# Patient Record
Sex: FEMALE | Race: WHITE | NOT HISPANIC OR LATINO | Employment: FULL TIME | URBAN - METROPOLITAN AREA
[De-identification: names, ages, dates, MRNs, and addresses within clinical notes are randomized per-mention and may not be internally consistent; named-entity substitution may affect disease eponyms.]

---

## 2023-06-21 ENCOUNTER — APPOINTMENT (OUTPATIENT)
Dept: LAB | Facility: LAB | Age: 43
End: 2023-06-21
Payer: COMMERCIAL

## 2023-06-21 LAB
ALBUMIN (G/DL) IN SER/PLAS: 4.1 G/DL (ref 3.4–5)
ANION GAP IN SER/PLAS: 15 MMOL/L (ref 10–20)
CALCIUM (MG/DL) IN SER/PLAS: 9.3 MG/DL (ref 8.6–10.6)
CARBON DIOXIDE, TOTAL (MMOL/L) IN SER/PLAS: 25 MMOL/L (ref 21–32)
CHLORIDE (MMOL/L) IN SER/PLAS: 102 MMOL/L (ref 98–107)
CREATININE (MG/DL) IN SER/PLAS: 0.77 MG/DL (ref 0.5–1.05)
GFR FEMALE: >90 ML/MIN/1.73M2
GLUCOSE (MG/DL) IN SER/PLAS: 91 MG/DL (ref 74–99)
PHOSPHATE (MG/DL) IN SER/PLAS: 3.3 MG/DL (ref 2.5–4.9)
POTASSIUM (MMOL/L) IN SER/PLAS: 4 MMOL/L (ref 3.5–5.3)
SODIUM (MMOL/L) IN SER/PLAS: 138 MMOL/L (ref 136–145)
UREA NITROGEN (MG/DL) IN SER/PLAS: 13 MG/DL (ref 6–23)

## 2023-10-04 DIAGNOSIS — K21.9 GASTROESOPHAGEAL REFLUX DISEASE WITHOUT ESOPHAGITIS: Primary | ICD-10-CM

## 2023-10-04 DIAGNOSIS — M35.9 UNDIFFERENTIATED CONNECTIVE TISSUE DISEASE (MULTI): Primary | ICD-10-CM

## 2023-10-04 RX ORDER — HYDROXYCHLOROQUINE SULFATE 200 MG/1
200 TABLET, FILM COATED ORAL DAILY
Qty: 30 TABLET | Refills: 1 | Status: SHIPPED | OUTPATIENT
Start: 2023-10-04 | End: 2023-12-01 | Stop reason: SDUPTHER

## 2023-10-04 RX ORDER — HYDROXYCHLOROQUINE SULFATE 200 MG/1
1 TABLET, FILM COATED ORAL DAILY
COMMUNITY
Start: 2020-10-08 | End: 2023-10-04 | Stop reason: SDUPTHER

## 2023-10-05 ENCOUNTER — PHARMACY VISIT (OUTPATIENT)
Dept: PHARMACY | Facility: CLINIC | Age: 43
End: 2023-10-05
Payer: COMMERCIAL

## 2023-10-05 PROCEDURE — RXMED WILLOW AMBULATORY MEDICATION CHARGE

## 2023-10-06 RX ORDER — PANTOPRAZOLE SODIUM 40 MG/1
40 TABLET, DELAYED RELEASE ORAL DAILY
Qty: 330 TABLET | Refills: 0 | Status: SHIPPED | OUTPATIENT
Start: 2023-10-06 | End: 2023-11-14 | Stop reason: SDUPTHER

## 2023-10-10 ENCOUNTER — PHARMACY VISIT (OUTPATIENT)
Dept: PHARMACY | Facility: CLINIC | Age: 43
End: 2023-10-10
Payer: COMMERCIAL

## 2023-10-10 PROCEDURE — RXMED WILLOW AMBULATORY MEDICATION CHARGE

## 2023-10-27 ENCOUNTER — PHARMACY VISIT (OUTPATIENT)
Dept: PHARMACY | Facility: CLINIC | Age: 43
End: 2023-10-27
Payer: COMMERCIAL

## 2023-10-27 PROCEDURE — RXMED WILLOW AMBULATORY MEDICATION CHARGE

## 2023-11-02 ENCOUNTER — PHARMACY VISIT (OUTPATIENT)
Dept: PHARMACY | Facility: CLINIC | Age: 43
End: 2023-11-02
Payer: COMMERCIAL

## 2023-11-02 ENCOUNTER — TELEPHONE (OUTPATIENT)
Dept: OBSTETRICS AND GYNECOLOGY | Facility: CLINIC | Age: 43
End: 2023-11-02
Payer: COMMERCIAL

## 2023-11-02 DIAGNOSIS — Z12.31 SCREENING MAMMOGRAM FOR BREAST CANCER: Primary | ICD-10-CM

## 2023-11-02 PROCEDURE — RXMED WILLOW AMBULATORY MEDICATION CHARGE

## 2023-11-14 ENCOUNTER — OFFICE VISIT (OUTPATIENT)
Dept: GASTROENTEROLOGY | Facility: CLINIC | Age: 43
End: 2023-11-14
Payer: COMMERCIAL

## 2023-11-14 VITALS — HEIGHT: 61 IN | OXYGEN SATURATION: 98 % | BODY MASS INDEX: 40.37 KG/M2 | WEIGHT: 213.8 LBS | HEART RATE: 106 BPM

## 2023-11-14 DIAGNOSIS — R07.9 CHEST PAIN, UNSPECIFIED TYPE: Primary | ICD-10-CM

## 2023-11-14 DIAGNOSIS — K21.9 GASTROESOPHAGEAL REFLUX DISEASE WITHOUT ESOPHAGITIS: ICD-10-CM

## 2023-11-14 PROCEDURE — 1036F TOBACCO NON-USER: CPT | Performed by: INTERNAL MEDICINE

## 2023-11-14 PROCEDURE — 99214 OFFICE O/P EST MOD 30 MIN: CPT | Performed by: INTERNAL MEDICINE

## 2023-11-14 RX ORDER — PANTOPRAZOLE SODIUM 40 MG/1
40 TABLET, DELAYED RELEASE ORAL DAILY
Qty: 90 TABLET | Refills: 3 | Status: SHIPPED | OUTPATIENT
Start: 2023-11-14 | End: 2024-11-13

## 2023-11-14 NOTE — PROGRESS NOTES
Subjective     History of Present Illness:   41 yo with history of chronic constipation, GERD, anxiety, migraine HA, undifferentiated connective tissue disease, seen in follow up .  Last seen 1 yr ago doing well on pantoprazole 40 mg daily. No issues with constipation at that time.       Overall doing well on pantoprazole. Denies heartburn/indigestion.  Over the last few  months has had ss chest pain with ambulation in AM, that resolves through day.  No dysphagia/odynophagia.  No unintentional weight loss.  Has been laying down within 1-2 hrs of eating at night.  Cardiac evaluation negative.  Bowel movements regular , no straining or incomplete evacuation.  No melena/brbpr  Takes nsaids occasionally        Colonoscopy 4/2021 sigmoid inflammation biopsies c/w focal active colitis; remainder of colon biopsies normal. Hgb 10.5, stool pcr negative, fecal calprotectin 190. Given cipro/flagyl .    EGD/colon 4/2018 negative      Allergies  Not on File    Medications       Objective   There were no vitals taken for this visit.   Physical Exam  Vitals reviewed.   Constitutional:       General: She is awake.   Cardiovascular:      Rate and Rhythm: Normal rate and regular rhythm.   Pulmonary:      Effort: Pulmonary effort is normal.      Breath sounds: Normal breath sounds.   Abdominal:      General: Bowel sounds are normal.      Palpations: Abdomen is soft.      Tenderness: There is no abdominal tenderness.   Neurological:      Mental Status: She is alert and oriented to person, place, and time.   Psychiatric:         Attention and Perception: Attention and perception normal.         Behavior: Behavior normal.               Assessment/Plan:  41 yo with history of chronic constipation, GERD, anxiety, migraine HA, undifferentiated connective tissue disease, seen in follow up. Recent chest pain with negative cardiac evaluation, possibly due to reflux.  Recommended to leave 3 hours between eating and laying down in evening and  will change PPI dosing to before dinner.  If persistent symptoms may change to alternate PPI. EGD may be needed if persistent sx to evaluaet for erosive disease, EOE, h pylori. Pt instructed to contact office if no improvement.          Rachel Nicolas MD

## 2023-11-15 ENCOUNTER — HOSPITAL ENCOUNTER (OUTPATIENT)
Dept: RADIOLOGY | Facility: HOSPITAL | Age: 43
Discharge: HOME | End: 2023-11-15
Payer: COMMERCIAL

## 2023-11-15 DIAGNOSIS — Z12.31 SCREENING MAMMOGRAM FOR BREAST CANCER: ICD-10-CM

## 2023-11-15 PROCEDURE — 77067 SCR MAMMO BI INCL CAD: CPT | Performed by: RADIOLOGY

## 2023-11-15 PROCEDURE — 77063 BREAST TOMOSYNTHESIS BI: CPT | Performed by: RADIOLOGY

## 2023-11-15 PROCEDURE — 77067 SCR MAMMO BI INCL CAD: CPT

## 2023-11-17 ENCOUNTER — PHARMACY VISIT (OUTPATIENT)
Dept: PHARMACY | Facility: CLINIC | Age: 43
End: 2023-11-17
Payer: COMMERCIAL

## 2023-11-17 PROCEDURE — RXMED WILLOW AMBULATORY MEDICATION CHARGE

## 2023-11-22 ENCOUNTER — TELEPHONE (OUTPATIENT)
Dept: OBSTETRICS AND GYNECOLOGY | Facility: CLINIC | Age: 43
End: 2023-11-22
Payer: COMMERCIAL

## 2023-11-22 DIAGNOSIS — R92.8 ABNORMAL MAMMOGRAM: Primary | ICD-10-CM

## 2023-11-27 ENCOUNTER — PHARMACY VISIT (OUTPATIENT)
Dept: PHARMACY | Facility: CLINIC | Age: 43
End: 2023-11-27
Payer: COMMERCIAL

## 2023-11-27 PROCEDURE — RXMED WILLOW AMBULATORY MEDICATION CHARGE

## 2023-11-28 ENCOUNTER — HOSPITAL ENCOUNTER (OUTPATIENT)
Dept: RADIOLOGY | Facility: HOSPITAL | Age: 43
Discharge: HOME | End: 2023-11-28
Payer: COMMERCIAL

## 2023-11-28 DIAGNOSIS — R92.8 OTHER ABNORMAL AND INCONCLUSIVE FINDINGS ON DIAGNOSTIC IMAGING OF BREAST: ICD-10-CM

## 2023-11-28 DIAGNOSIS — R92.8 ABNORMAL MAMMOGRAM: ICD-10-CM

## 2023-11-28 PROCEDURE — 76642 ULTRASOUND BREAST LIMITED: CPT | Mod: RT

## 2023-11-28 PROCEDURE — 76982 USE 1ST TARGET LESION: CPT | Mod: RT

## 2023-11-28 PROCEDURE — 76642 ULTRASOUND BREAST LIMITED: CPT | Mod: RIGHT SIDE | Performed by: RADIOLOGY

## 2023-11-29 ENCOUNTER — PHARMACY VISIT (OUTPATIENT)
Dept: PHARMACY | Facility: CLINIC | Age: 43
End: 2023-11-29
Payer: COMMERCIAL

## 2023-11-29 PROCEDURE — RXMED WILLOW AMBULATORY MEDICATION CHARGE

## 2023-12-01 DIAGNOSIS — M35.9 UNDIFFERENTIATED CONNECTIVE TISSUE DISEASE (MULTI): ICD-10-CM

## 2023-12-02 RX ORDER — HYDROXYCHLOROQUINE SULFATE 200 MG/1
200 TABLET, FILM COATED ORAL DAILY
Qty: 30 TABLET | Refills: 0 | Status: SHIPPED | OUTPATIENT
Start: 2023-12-02 | End: 2023-12-12 | Stop reason: SDUPTHER

## 2023-12-04 ENCOUNTER — PHARMACY VISIT (OUTPATIENT)
Dept: PHARMACY | Facility: CLINIC | Age: 43
End: 2023-12-04
Payer: COMMERCIAL

## 2023-12-04 PROCEDURE — RXMED WILLOW AMBULATORY MEDICATION CHARGE

## 2023-12-12 ENCOUNTER — OFFICE VISIT (OUTPATIENT)
Dept: RHEUMATOLOGY | Facility: CLINIC | Age: 43
End: 2023-12-12
Payer: COMMERCIAL

## 2023-12-12 VITALS
SYSTOLIC BLOOD PRESSURE: 130 MMHG | BODY MASS INDEX: 40.02 KG/M2 | DIASTOLIC BLOOD PRESSURE: 82 MMHG | HEIGHT: 61 IN | WEIGHT: 212 LBS

## 2023-12-12 DIAGNOSIS — M35.9 UNDIFFERENTIATED CONNECTIVE TISSUE DISEASE (MULTI): ICD-10-CM

## 2023-12-12 DIAGNOSIS — Z79.899 LONG-TERM USE OF PLAQUENIL: Primary | ICD-10-CM

## 2023-12-12 PROCEDURE — 99213 OFFICE O/P EST LOW 20 MIN: CPT | Performed by: INTERNAL MEDICINE

## 2023-12-12 PROCEDURE — 1036F TOBACCO NON-USER: CPT | Performed by: INTERNAL MEDICINE

## 2023-12-12 RX ORDER — HYDROXYCHLOROQUINE SULFATE 200 MG/1
200 TABLET, FILM COATED ORAL DAILY
Qty: 90 TABLET | Refills: 3 | Status: SHIPPED | OUTPATIENT
Start: 2023-12-12 | End: 2024-02-01 | Stop reason: SDUPTHER

## 2023-12-12 RX ORDER — CHOLECALCIFEROL (VITAMIN D3) 25 MCG
1000 TABLET ORAL DAILY
COMMUNITY

## 2023-12-12 RX ORDER — FLUTICASONE PROPIONATE 50 MCG
2 SPRAY, SUSPENSION (ML) NASAL DAILY
COMMUNITY
Start: 2019-07-29

## 2023-12-12 ASSESSMENT — ENCOUNTER SYMPTOMS
FATIGUE: 1
SLEEP DISTURBANCE: 1

## 2023-12-12 NOTE — PROGRESS NOTES
Subjective   Patient ID: Paola Mahoney is a 43 y.o. female who presents for Follow-up (Annual follow up/Hip pain in both hips for about 6 months ).  HPI  Patient with positive SERGIO with negative ANGELICA panel with elevated inflammatory markers and enlarged lymph nodes. Has negative RF, CCP, negative HLA-B27 negative ACE level, negative ANCA, negative antithyroid peroxidase antibody though enlarged thyroid seen on scan, multiple sclerosis has been ruled out       At her last visit on 12/13/2022, we had her continue with hydroxychloroquine.  In the past she had discontinued and had increase in symptoms.    She feels she still has flareups.  Some there is a couple days in a row that she will feel sore and sometimes it is around her and sees.    Her neck has been bothering her and in her hips now for the last 6 months.  Getting down on the ground has been more tricky.  She tries to stretch.    Her mouth and eyes has been more dry and her provider prescribed pilocarpine which has been helpful.    She did see a cardiologist because she was getting her chest when she exerted herself.  Workup was negative.  She saw her GI provider and had her switch medication to a different time of day and it does not seem to have changed her symptoms.  Review of Systems   Constitutional:  Positive for fatigue.   HENT:          + dry mouth   Eyes:         +dry eyes   Musculoskeletal:         Per HPI   Psychiatric/Behavioral:  Positive for sleep disturbance.        Objective   Physical Exam  GEN: NAD A&O x3 appropriate affect  HENT: no enlarged glands or thyroid  EYES: no conjunctival redness, eyelids normal  LYMPH: no cervical lymphadenopathy  NEURO: 5/5 strength upper and lower extremities, DTR +2 bicep and patellar tendons  SKIN: no rashes,   PULSES: +radials  TENDER POINTS: 0/18   MSK:   no synovitis in the joints of her hands, wrist elbows shoulder knees ankles.  Good internal/external rotation of the bilateral hips.  Good  flexion.  Assessment/Plan       Undifferentiated connective tissue disease with positive SERGIO, lymphadenopathy, elevated inflammatory markers with muscle, neurologic, joint symptoms. Previously had been on sulfasalazine   -She does believe that hydroxychloroquine has been helpful for musculoskeletal symptoms.  Will do blood work again concerning her positive SERGIO.  Symptoms usually occur around her menses.    -Discussed about hip flexor stretches.        Will see her back in 1 year or as needed.  Mady Chen MD 12/12/23 3:45 PM

## 2023-12-26 PROCEDURE — RXMED WILLOW AMBULATORY MEDICATION CHARGE

## 2023-12-28 ENCOUNTER — PHARMACY VISIT (OUTPATIENT)
Dept: PHARMACY | Facility: CLINIC | Age: 43
End: 2023-12-28
Payer: COMMERCIAL

## 2024-01-24 ENCOUNTER — LAB (OUTPATIENT)
Dept: LAB | Facility: LAB | Age: 44
End: 2024-01-24
Payer: COMMERCIAL

## 2024-01-24 DIAGNOSIS — M35.9 UNDIFFERENTIATED CONNECTIVE TISSUE DISEASE (MULTI): ICD-10-CM

## 2024-01-24 LAB
ALBUMIN SERPL BCP-MCNC: 3.9 G/DL (ref 3.4–5)
ALP SERPL-CCNC: 72 U/L (ref 33–110)
ALT SERPL W P-5'-P-CCNC: 13 U/L (ref 7–45)
ANION GAP SERPL CALC-SCNC: 15 MMOL/L (ref 10–20)
AST SERPL W P-5'-P-CCNC: 15 U/L (ref 9–39)
BILIRUB SERPL-MCNC: 0.3 MG/DL (ref 0–1.2)
BUN SERPL-MCNC: 10 MG/DL (ref 6–23)
C3 SERPL-MCNC: 189 MG/DL (ref 87–200)
C4 SERPL-MCNC: 52 MG/DL (ref 10–50)
CALCIUM SERPL-MCNC: 9.6 MG/DL (ref 8.6–10.6)
CENTROMERE B AB SER-ACNC: <0.2 AI
CHLORIDE SERPL-SCNC: 104 MMOL/L (ref 98–107)
CHROMATIN AB SERPL-ACNC: <0.2 AI
CO2 SERPL-SCNC: 24 MMOL/L (ref 21–32)
CREAT SERPL-MCNC: 0.69 MG/DL (ref 0.5–1.05)
CRP SERPL-MCNC: 1.93 MG/DL
DSDNA AB SER-ACNC: <1 IU/ML
EGFRCR SERPLBLD CKD-EPI 2021: >90 ML/MIN/1.73M*2
ENA JO1 AB SER QL IA: <0.2 AI
ENA RNP AB SER IA-ACNC: <0.2 AI
ENA SCL70 AB SER QL IA: <0.2 AI
ENA SM AB SER IA-ACNC: <0.2 AI
ENA SM+RNP AB SER QL IA: <0.2 AI
ENA SS-A AB SER IA-ACNC: <0.2 AI
ENA SS-B AB SER IA-ACNC: <0.2 AI
ERYTHROCYTE [DISTWIDTH] IN BLOOD BY AUTOMATED COUNT: 13.8 % (ref 11.5–14.5)
ERYTHROCYTE [SEDIMENTATION RATE] IN BLOOD BY WESTERGREN METHOD: 26 MM/H (ref 0–20)
GLUCOSE SERPL-MCNC: 119 MG/DL (ref 74–99)
HCT VFR BLD AUTO: 35.8 % (ref 36–46)
HGB BLD-MCNC: 11.2 G/DL (ref 12–16)
MCH RBC QN AUTO: 26.4 PG (ref 26–34)
MCHC RBC AUTO-ENTMCNC: 31.3 G/DL (ref 32–36)
MCV RBC AUTO: 84 FL (ref 80–100)
NRBC BLD-RTO: 0 /100 WBCS (ref 0–0)
PLATELET # BLD AUTO: 377 X10*3/UL (ref 150–450)
POTASSIUM SERPL-SCNC: 4.2 MMOL/L (ref 3.5–5.3)
PROT SERPL-MCNC: 6.9 G/DL (ref 6.4–8.2)
RBC # BLD AUTO: 4.25 X10*6/UL (ref 4–5.2)
RIBOSOMAL P AB SER-ACNC: <0.2 AI
SODIUM SERPL-SCNC: 139 MMOL/L (ref 136–145)
WBC # BLD AUTO: 6.7 X10*3/UL (ref 4.4–11.3)

## 2024-01-24 PROCEDURE — 86038 ANTINUCLEAR ANTIBODIES: CPT

## 2024-01-24 PROCEDURE — 86235 NUCLEAR ANTIGEN ANTIBODY: CPT

## 2024-01-24 PROCEDURE — 80053 COMPREHEN METABOLIC PANEL: CPT

## 2024-01-24 PROCEDURE — 85027 COMPLETE CBC AUTOMATED: CPT

## 2024-01-24 PROCEDURE — 85652 RBC SED RATE AUTOMATED: CPT

## 2024-01-24 PROCEDURE — 86140 C-REACTIVE PROTEIN: CPT

## 2024-01-24 PROCEDURE — 86225 DNA ANTIBODY NATIVE: CPT

## 2024-01-24 PROCEDURE — 86160 COMPLEMENT ANTIGEN: CPT

## 2024-01-24 PROCEDURE — 36415 COLL VENOUS BLD VENIPUNCTURE: CPT

## 2024-01-28 LAB
ANA PATTERN: ABNORMAL
ANA SER QL HEP2 SUBST: POSITIVE
ANA TITR SER IF: ABNORMAL {TITER}

## 2024-02-01 ENCOUNTER — PATIENT MESSAGE (OUTPATIENT)
Dept: RHEUMATOLOGY | Facility: CLINIC | Age: 44
End: 2024-02-01
Payer: COMMERCIAL

## 2024-02-01 DIAGNOSIS — M35.9 UNDIFFERENTIATED CONNECTIVE TISSUE DISEASE (MULTI): ICD-10-CM

## 2024-02-01 RX ORDER — HYDROXYCHLOROQUINE SULFATE 200 MG/1
400 TABLET, FILM COATED ORAL DAILY
Qty: 180 TABLET | Refills: 3 | Status: SHIPPED | OUTPATIENT
Start: 2024-02-01 | End: 2025-01-31

## 2024-02-09 PROCEDURE — RXMED WILLOW AMBULATORY MEDICATION CHARGE

## 2024-02-12 ENCOUNTER — PHARMACY VISIT (OUTPATIENT)
Dept: PHARMACY | Facility: CLINIC | Age: 44
End: 2024-02-12
Payer: COMMERCIAL

## 2024-02-12 ENCOUNTER — TELEPHONE (OUTPATIENT)
Dept: GASTROENTEROLOGY | Facility: CLINIC | Age: 44
End: 2024-02-12
Payer: COMMERCIAL

## 2024-02-12 DIAGNOSIS — R07.9 CHEST PAIN, UNSPECIFIED TYPE: ICD-10-CM

## 2024-02-12 DIAGNOSIS — K21.9 GASTROESOPHAGEAL REFLUX DISEASE WITHOUT ESOPHAGITIS: Primary | ICD-10-CM

## 2024-02-12 PROCEDURE — RXMED WILLOW AMBULATORY MEDICATION CHARGE

## 2024-02-12 RX ORDER — OMEPRAZOLE 40 MG/1
40 CAPSULE, DELAYED RELEASE ORAL DAILY
Qty: 90 CAPSULE | Refills: 3 | Status: SHIPPED | OUTPATIENT
Start: 2024-02-12 | End: 2024-05-13 | Stop reason: ALTCHOICE

## 2024-02-12 NOTE — TELEPHONE ENCOUNTER
Pt called this morning stating that she has been on the pantoprazole and its just not working, she said that at her last office visit you mentions that it could be changed and she is thinking that is needed.

## 2024-02-13 ENCOUNTER — PHARMACY VISIT (OUTPATIENT)
Dept: PHARMACY | Facility: CLINIC | Age: 44
End: 2024-02-13
Payer: COMMERCIAL

## 2024-02-19 ENCOUNTER — PHARMACY VISIT (OUTPATIENT)
Dept: PHARMACY | Facility: CLINIC | Age: 44
End: 2024-02-19
Payer: COMMERCIAL

## 2024-02-19 PROCEDURE — RXMED WILLOW AMBULATORY MEDICATION CHARGE

## 2024-02-21 ENCOUNTER — PHARMACY VISIT (OUTPATIENT)
Dept: PHARMACY | Facility: CLINIC | Age: 44
End: 2024-02-21
Payer: COMMERCIAL

## 2024-02-21 PROCEDURE — RXMED WILLOW AMBULATORY MEDICATION CHARGE

## 2024-02-23 ENCOUNTER — HOSPITAL ENCOUNTER (OUTPATIENT)
Dept: RADIOLOGY | Facility: HOSPITAL | Age: 44
Discharge: HOME | End: 2024-02-23
Payer: COMMERCIAL

## 2024-02-23 DIAGNOSIS — M54.9 DORSALGIA, UNSPECIFIED: ICD-10-CM

## 2024-02-23 DIAGNOSIS — M06.4 INFLAMMATORY POLYARTHROPATHY (MULTI): ICD-10-CM

## 2024-02-23 PROCEDURE — 73522 X-RAY EXAM HIPS BI 3-4 VIEWS: CPT | Mod: BILATERAL PROCEDURE | Performed by: STUDENT IN AN ORGANIZED HEALTH CARE EDUCATION/TRAINING PROGRAM

## 2024-02-23 PROCEDURE — 73522 X-RAY EXAM HIPS BI 3-4 VIEWS: CPT

## 2024-02-23 PROCEDURE — 72110 X-RAY EXAM L-2 SPINE 4/>VWS: CPT

## 2024-02-23 PROCEDURE — 72110 X-RAY EXAM L-2 SPINE 4/>VWS: CPT | Performed by: STUDENT IN AN ORGANIZED HEALTH CARE EDUCATION/TRAINING PROGRAM

## 2024-02-27 ENCOUNTER — LAB (OUTPATIENT)
Dept: LAB | Facility: LAB | Age: 44
End: 2024-02-27
Payer: COMMERCIAL

## 2024-02-27 DIAGNOSIS — M35.9 SYSTEMIC INVOLVEMENT OF CONNECTIVE TISSUE, UNSPECIFIED (MULTI): ICD-10-CM

## 2024-02-27 DIAGNOSIS — R53.83 OTHER FATIGUE: ICD-10-CM

## 2024-02-27 DIAGNOSIS — E55.9 VITAMIN D DEFICIENCY, UNSPECIFIED: ICD-10-CM

## 2024-02-27 DIAGNOSIS — H04.123 DRY EYE SYNDROME OF BILATERAL LACRIMAL GLANDS: ICD-10-CM

## 2024-02-27 DIAGNOSIS — M54.9 DORSALGIA, UNSPECIFIED: ICD-10-CM

## 2024-02-27 DIAGNOSIS — D51.3 OTHER DIETARY VITAMIN B12 DEFICIENCY ANEMIA: Primary | ICD-10-CM

## 2024-02-27 LAB
25(OH)D3 SERPL-MCNC: 31 NG/ML (ref 30–100)
CK SERPL-CCNC: 102 U/L (ref 0–215)
ERYTHROCYTE [SEDIMENTATION RATE] IN BLOOD BY WESTERGREN METHOD: 26 MM/H (ref 0–20)
HAV AB SER QL IA: NONREACTIVE
HBV SURFACE AB SER-ACNC: 19.1 MIU/ML
HBV SURFACE AG SERPL QL IA: NONREACTIVE
HCV AB SER QL: NONREACTIVE
TSH SERPL-ACNC: 1.06 MIU/L (ref 0.44–3.98)
URATE SERPL-MCNC: 5.7 MG/DL (ref 2.3–6.7)
VIT B12 SERPL-MCNC: 325 PG/ML (ref 211–911)

## 2024-02-27 PROCEDURE — 87340 HEPATITIS B SURFACE AG IA: CPT

## 2024-02-27 PROCEDURE — 82550 ASSAY OF CK (CPK): CPT

## 2024-02-27 PROCEDURE — 86706 HEP B SURFACE ANTIBODY: CPT

## 2024-02-27 PROCEDURE — 82607 VITAMIN B-12: CPT

## 2024-02-27 PROCEDURE — 82306 VITAMIN D 25 HYDROXY: CPT

## 2024-02-27 PROCEDURE — 86803 HEPATITIS C AB TEST: CPT

## 2024-02-27 PROCEDURE — 86481 TB AG RESPONSE T-CELL SUSP: CPT

## 2024-02-27 PROCEDURE — RXMED WILLOW AMBULATORY MEDICATION CHARGE

## 2024-02-27 PROCEDURE — 85652 RBC SED RATE AUTOMATED: CPT

## 2024-02-27 PROCEDURE — 81381 HLA I TYPING 1 ALLELE HR: CPT

## 2024-02-27 PROCEDURE — 84550 ASSAY OF BLOOD/URIC ACID: CPT

## 2024-02-27 PROCEDURE — 36415 COLL VENOUS BLD VENIPUNCTURE: CPT

## 2024-02-27 PROCEDURE — 86708 HEPATITIS A ANTIBODY: CPT

## 2024-02-27 PROCEDURE — 84443 ASSAY THYROID STIM HORMONE: CPT

## 2024-02-28 ENCOUNTER — PHARMACY VISIT (OUTPATIENT)
Dept: PHARMACY | Facility: CLINIC | Age: 44
End: 2024-02-28
Payer: COMMERCIAL

## 2024-02-29 LAB
NIL(NEG) CONTROL SPOT COUNT: NORMAL
PANEL A SPOT COUNT: 0
PANEL B SPOT COUNT: 0
POS CONTROL SPOT COUNT: NORMAL
T-SPOT. TB INTERPRETATION: NEGATIVE

## 2024-03-05 LAB — HLAB27 TYPING: NEGATIVE

## 2024-03-07 ENCOUNTER — PHARMACY VISIT (OUTPATIENT)
Dept: PHARMACY | Facility: CLINIC | Age: 44
End: 2024-03-07
Payer: COMMERCIAL

## 2024-03-07 PROCEDURE — RXMED WILLOW AMBULATORY MEDICATION CHARGE

## 2024-03-07 RX ORDER — PREDNISONE 10 MG/1
TABLET ORAL
Qty: 90 TABLET | Refills: 1 | OUTPATIENT
Start: 2024-03-07

## 2024-03-11 ENCOUNTER — TELEPHONE (OUTPATIENT)
Dept: OBSTETRICS AND GYNECOLOGY | Facility: CLINIC | Age: 44
End: 2024-03-11
Payer: COMMERCIAL

## 2024-03-11 DIAGNOSIS — R92.8 ABNORMAL MAMMOGRAM: Primary | ICD-10-CM

## 2024-03-11 NOTE — TELEPHONE ENCOUNTER
Pt had mammogram and ultrasound 11/2023 and was recommended to follow up for a (R) breast ultrasound in 6 months for probable fibroadenomas.  Pt would like ultrasound order so she can get it scheduled.  Pt is also wondering if she should stop taking her birth control due to the changes in her breasts, would it help?

## 2024-03-14 ENCOUNTER — TELEPHONE (OUTPATIENT)
Dept: GASTROENTEROLOGY | Facility: CLINIC | Age: 44
End: 2024-03-14
Payer: COMMERCIAL

## 2024-03-14 NOTE — TELEPHONE ENCOUNTER
Pt called with question, she was given a prednisone for about 2 weeks  from her rheumatologist because her inflammation has been really bad lately, she wants to make sure its ok for her to have EGD on 3/28/2024.

## 2024-03-28 ENCOUNTER — APPOINTMENT (OUTPATIENT)
Dept: GASTROENTEROLOGY | Facility: EXTERNAL LOCATION | Age: 44
End: 2024-03-28
Payer: COMMERCIAL

## 2024-04-01 PROCEDURE — RXMED WILLOW AMBULATORY MEDICATION CHARGE

## 2024-04-02 ENCOUNTER — PHARMACY VISIT (OUTPATIENT)
Dept: PHARMACY | Facility: CLINIC | Age: 44
End: 2024-04-02
Payer: COMMERCIAL

## 2024-04-04 ENCOUNTER — OFFICE VISIT (OUTPATIENT)
Dept: GASTROENTEROLOGY | Facility: EXTERNAL LOCATION | Age: 44
End: 2024-04-04
Payer: COMMERCIAL

## 2024-04-04 ENCOUNTER — LAB REQUISITION (OUTPATIENT)
Dept: LAB | Facility: HOSPITAL | Age: 44
End: 2024-04-04
Payer: COMMERCIAL

## 2024-04-04 DIAGNOSIS — K21.9 GASTROESOPHAGEAL REFLUX DISEASE WITHOUT ESOPHAGITIS: ICD-10-CM

## 2024-04-04 DIAGNOSIS — R07.9 CHEST PAIN, UNSPECIFIED TYPE: ICD-10-CM

## 2024-04-04 DIAGNOSIS — K31.7 GASTRIC POLYP: Primary | ICD-10-CM

## 2024-04-04 PROCEDURE — 43239 EGD BIOPSY SINGLE/MULTIPLE: CPT | Performed by: INTERNAL MEDICINE

## 2024-04-04 PROCEDURE — 88305 TISSUE EXAM BY PATHOLOGIST: CPT

## 2024-04-10 LAB
LABORATORY COMMENT REPORT: NORMAL
PATH REPORT.FINAL DX SPEC: NORMAL
PATH REPORT.GROSS SPEC: NORMAL
PATH REPORT.RELEVANT HX SPEC: NORMAL
PATH REPORT.TOTAL CANCER: NORMAL

## 2024-04-17 ENCOUNTER — OFFICE VISIT (OUTPATIENT)
Dept: DERMATOLOGY | Facility: CLINIC | Age: 44
End: 2024-04-17
Payer: COMMERCIAL

## 2024-04-17 DIAGNOSIS — L57.8 OTHER SKIN CHANGES DUE TO CHRONIC EXPOSURE TO NONIONIZING RADIATION: Primary | ICD-10-CM

## 2024-04-17 DIAGNOSIS — L81.4 LENTIGO: ICD-10-CM

## 2024-04-17 DIAGNOSIS — L30.1 DYSHIDROSIS: ICD-10-CM

## 2024-04-17 DIAGNOSIS — D18.01 HEMANGIOMA OF SKIN: ICD-10-CM

## 2024-04-17 DIAGNOSIS — D22.9 MELANOCYTIC NEVUS, UNSPECIFIED LOCATION: ICD-10-CM

## 2024-04-17 PROCEDURE — RXMED WILLOW AMBULATORY MEDICATION CHARGE

## 2024-04-17 PROCEDURE — 99204 OFFICE O/P NEW MOD 45 MIN: CPT | Performed by: STUDENT IN AN ORGANIZED HEALTH CARE EDUCATION/TRAINING PROGRAM

## 2024-04-17 RX ORDER — CLOBETASOL PROPIONATE 0.5 MG/G
CREAM TOPICAL 2 TIMES DAILY
Qty: 60 G | Refills: 3 | Status: SHIPPED | OUTPATIENT
Start: 2024-04-17 | End: 2024-05-02

## 2024-04-17 ASSESSMENT — DERMATOLOGY QUALITY OF LIFE (QOL) ASSESSMENT
WHAT SINGLE SKIN CONDITION LISTED BELOW IS THE PATIENT ANSWERING THE QUALITY-OF-LIFE ASSESSMENT QUESTIONS ABOUT: NONE OF THE ABOVE
RATE HOW BOTHERED YOU ARE BY SYMPTOMS OF YOUR SKIN PROBLEM (EG, ITCHING, STINGING BURNING, HURTING OR SKIN IRRITATION): 1
RATE HOW BOTHERED YOU ARE BY EFFECTS OF YOUR SKIN PROBLEMS ON YOUR ACTIVITIES (EG, GOING OUT, ACCOMPLISHING WHAT YOU WANT, WORK ACTIVITIES OR YOUR RELATIONSHIPS WITH OTHERS): 0 - NEVER BOTHERED
RATE HOW BOTHERED YOU ARE BY SYMPTOMS OF YOUR SKIN PROBLEM (EG, ITCHING, STINGING BURNING, HURTING OR SKIN IRRITATION): 1
RATE HOW EMOTIONALLY BOTHERED YOU ARE BY YOUR SKIN PROBLEM (FOR EXAMPLE, WORRY, EMBARRASSMENT, FRUSTRATION): 0 - NEVER BOTHERED
RATE HOW BOTHERED YOU ARE BY EFFECTS OF YOUR SKIN PROBLEMS ON YOUR ACTIVITIES (EG, GOING OUT, ACCOMPLISHING WHAT YOU WANT, WORK ACTIVITIES OR YOUR RELATIONSHIPS WITH OTHERS): 0 - NEVER BOTHERED
RATE HOW EMOTIONALLY BOTHERED YOU ARE BY YOUR SKIN PROBLEM (FOR EXAMPLE, WORRY, EMBARRASSMENT, FRUSTRATION): 0 - NEVER BOTHERED
WHAT SINGLE SKIN CONDITION LISTED BELOW IS THE PATIENT ANSWERING THE QUALITY-OF-LIFE ASSESSMENT QUESTIONS ABOUT: NONE OF THE ABOVE

## 2024-04-17 ASSESSMENT — PATIENT GLOBAL ASSESSMENT (PGA): WHAT IS THE PGA: PATIENT GLOBAL ASSESSMENT:  2 - MILD

## 2024-04-17 NOTE — PROGRESS NOTES
Subjective     Paola Mahoney is a 43 y.o. female who presents for the following: Skin Check (FBSE, no history of skin cancer. ).     Review of Systems:  No other skin or systemic complaints other than what is documented elsewhere in the note.    The following portions of the chart were reviewed this encounter and updated as appropriate:         Skin Cancer History  No skin cancer on file.      Specialty Problems    None       Objective   Well appearing patient in no apparent distress; mood and affect are within normal limits.    A full examination was performed including scalp, head, eyes, ears, nose, lips, neck, chest, axillae, abdomen, back, buttocks, bilateral upper extremities, bilateral lower extremities, hands, feet, fingers, toes, fingernails, and toenails. All findings within normal limits unless otherwise noted below.    Assessment/Plan   1. Other skin changes due to chronic exposure to nonionizing radiation  Mottled pigmentation, telangiectasias and brown reticular macules in sun exposed areas on the face, extremities, trunk    The risk of chronic, cumulative sun damage and risk of development of skin cancer was reviewed with the patient today. Discussed important of sun protection with sun protective clothing and/or broad spectrum sunscreen spf 30 or above.  Warning signs of skin cancer were reviewed. Patient to contact office should they notice any new or changing pre-existing skin lesion.    Related Procedures  Follow Up In Dermatology - Established Patient    2. Melanocytic nevus, unspecified location  Benign to slightly atypical appearing brown pigmented macules and papules    Clinically benign appearing nevi, reassurance provided to the patient today. Discussed important of sun protection with sun protective clothing and/or broad spectrum sunscreen spf 30 or above.  ABCDEs of melanoma reviewed. Patient to f/u should they notice any new or changing pre-existing skin lesion.    3. Hemangioma of  skin  Bright red papules    Discussed benign nature of condition, reassured. Reviewed warning signs of skin cancer with patient.    4. Lentigo  Scattered tan macules in sun-exposed areas.    Benign nature of these skin lesions reviewed and relation to sun exposure discussed. Reassurance provided. Reviewed warning signs of skin cancer.    5. Dyshidrosis  Left Hand - Anterior, Right Hand - Anterior  Scaling and deep seated vesicles on palms    Discussed diagnosis and chronic nature of condition  Flaring today  Discussed gentle skin care, increase in emollient use following hand washing  Start clobetasol 0.05% cream. Patient to apply to affected areas 2x daily x 2 weeks then 1 week off, repeat as needed. Side effects of topical steroids were reviewed including risk of skin atrophy.        Related Medications  clobetasol (Temovate) 0.05 % cream  Apply topically 2 times a day for 14 days.

## 2024-04-18 ENCOUNTER — PHARMACY VISIT (OUTPATIENT)
Dept: PHARMACY | Facility: CLINIC | Age: 44
End: 2024-04-18
Payer: COMMERCIAL

## 2024-04-29 ENCOUNTER — TELEPHONE (OUTPATIENT)
Dept: GASTROENTEROLOGY | Facility: CLINIC | Age: 44
End: 2024-04-29
Payer: COMMERCIAL

## 2024-04-29 DIAGNOSIS — R07.9 CHEST PAIN, UNSPECIFIED TYPE: Primary | ICD-10-CM

## 2024-04-29 DIAGNOSIS — K21.9 GASTROESOPHAGEAL REFLUX DISEASE WITHOUT ESOPHAGITIS: ICD-10-CM

## 2024-04-29 NOTE — TELEPHONE ENCOUNTER
Pt called she is wondering what she should do, said she is still having the chest pain, and she wants to know if it is reflux or not since EGD was normal? Also should she look into what else could be causing the chest pain?

## 2024-05-03 DIAGNOSIS — Z30.41 ENCOUNTER FOR SURVEILLANCE OF CONTRACEPTIVE PILLS: Primary | ICD-10-CM

## 2024-05-03 PROCEDURE — RXMED WILLOW AMBULATORY MEDICATION CHARGE

## 2024-05-03 RX ORDER — NORGESTIMATE AND ETHINYL ESTRADIOL 7DAYSX3 LO
1 KIT ORAL DAILY
Qty: 84 TABLET | Refills: 3 | Status: SHIPPED | OUTPATIENT
Start: 2024-05-03 | End: 2025-05-03

## 2024-05-06 ENCOUNTER — PHARMACY VISIT (OUTPATIENT)
Dept: PHARMACY | Facility: CLINIC | Age: 44
End: 2024-05-06
Payer: COMMERCIAL

## 2024-05-06 ENCOUNTER — APPOINTMENT (OUTPATIENT)
Dept: RADIOLOGY | Facility: HOSPITAL | Age: 44
End: 2024-05-06
Payer: COMMERCIAL

## 2024-05-06 PROCEDURE — RXMED WILLOW AMBULATORY MEDICATION CHARGE

## 2024-05-07 ENCOUNTER — APPOINTMENT (OUTPATIENT)
Dept: OPHTHALMOLOGY | Facility: CLINIC | Age: 44
End: 2024-05-07
Payer: COMMERCIAL

## 2024-05-07 ENCOUNTER — PHARMACY VISIT (OUTPATIENT)
Dept: PHARMACY | Facility: CLINIC | Age: 44
End: 2024-05-07
Payer: COMMERCIAL

## 2024-05-15 ENCOUNTER — HOSPITAL ENCOUNTER (OUTPATIENT)
Dept: RADIOLOGY | Facility: HOSPITAL | Age: 44
Discharge: HOME | End: 2024-05-15
Payer: COMMERCIAL

## 2024-05-15 DIAGNOSIS — R92.8 ABNORMAL MAMMOGRAM: ICD-10-CM

## 2024-05-15 PROCEDURE — 76982 USE 1ST TARGET LESION: CPT | Mod: RT

## 2024-05-15 PROCEDURE — 76642 ULTRASOUND BREAST LIMITED: CPT | Mod: RT

## 2024-05-16 ENCOUNTER — HOSPITAL ENCOUNTER (OUTPATIENT)
Dept: GASTROENTEROLOGY | Facility: HOSPITAL | Age: 44
Setting detail: OUTPATIENT SURGERY
Discharge: HOME | End: 2024-05-16
Payer: COMMERCIAL

## 2024-05-16 VITALS
DIASTOLIC BLOOD PRESSURE: 81 MMHG | OXYGEN SATURATION: 99 % | HEART RATE: 80 BPM | RESPIRATION RATE: 16 BRPM | SYSTOLIC BLOOD PRESSURE: 151 MMHG

## 2024-05-16 DIAGNOSIS — R07.9 CHEST PAIN, UNSPECIFIED TYPE: ICD-10-CM

## 2024-05-16 DIAGNOSIS — K21.9 GASTROESOPHAGEAL REFLUX DISEASE WITHOUT ESOPHAGITIS: ICD-10-CM

## 2024-05-16 PROCEDURE — 91010 ESOPHAGUS MOTILITY STUDY: CPT

## 2024-05-16 RX ORDER — CYANOCOBALAMIN (VITAMIN B-12) 250 MCG
250 TABLET ORAL DAILY
COMMUNITY

## 2024-05-16 ASSESSMENT — PAIN - FUNCTIONAL ASSESSMENT
PAIN_FUNCTIONAL_ASSESSMENT: 0-10
PAIN_FUNCTIONAL_ASSESSMENT: 0-10

## 2024-05-16 ASSESSMENT — PAIN SCALES - GENERAL
PAINLEVEL_OUTOF10: 0 - NO PAIN
PAINLEVEL_OUTOF10: 0 - NO PAIN

## 2024-05-20 DIAGNOSIS — F41.9 ANXIETY: ICD-10-CM

## 2024-05-20 PROCEDURE — RXMED WILLOW AMBULATORY MEDICATION CHARGE

## 2024-05-20 RX ORDER — CITALOPRAM 40 MG/1
40 TABLET, FILM COATED ORAL DAILY
Qty: 90 TABLET | Refills: 3 | Status: SHIPPED | OUTPATIENT
Start: 2024-05-20 | End: 2025-05-20

## 2024-05-21 ENCOUNTER — PHARMACY VISIT (OUTPATIENT)
Dept: PHARMACY | Facility: CLINIC | Age: 44
End: 2024-05-21
Payer: COMMERCIAL

## 2024-05-21 PROCEDURE — RXMED WILLOW AMBULATORY MEDICATION CHARGE

## 2024-06-04 PROCEDURE — RXMED WILLOW AMBULATORY MEDICATION CHARGE

## 2024-06-07 ENCOUNTER — PHARMACY VISIT (OUTPATIENT)
Dept: PHARMACY | Facility: CLINIC | Age: 44
End: 2024-06-07
Payer: COMMERCIAL

## 2024-06-26 ENCOUNTER — TELEMEDICINE (OUTPATIENT)
Dept: PRIMARY CARE | Facility: CLINIC | Age: 44
End: 2024-06-26
Payer: COMMERCIAL

## 2024-06-26 ENCOUNTER — APPOINTMENT (OUTPATIENT)
Dept: PRIMARY CARE | Facility: CLINIC | Age: 44
End: 2024-06-26
Payer: COMMERCIAL

## 2024-06-26 ENCOUNTER — PHARMACY VISIT (OUTPATIENT)
Dept: PHARMACY | Facility: CLINIC | Age: 44
End: 2024-06-26
Payer: COMMERCIAL

## 2024-06-26 DIAGNOSIS — J01.10 ACUTE NON-RECURRENT FRONTAL SINUSITIS: Primary | ICD-10-CM

## 2024-06-26 PROCEDURE — 99213 OFFICE O/P EST LOW 20 MIN: CPT

## 2024-06-26 PROCEDURE — RXMED WILLOW AMBULATORY MEDICATION CHARGE

## 2024-06-26 RX ORDER — AMOXICILLIN AND CLAVULANATE POTASSIUM 875; 125 MG/1; MG/1
1 TABLET, FILM COATED ORAL 2 TIMES DAILY
Qty: 20 TABLET | Refills: 0 | Status: SHIPPED | OUTPATIENT
Start: 2024-06-26 | End: 2024-07-06

## 2024-06-26 ASSESSMENT — ENCOUNTER SYMPTOMS
COUGH: 1
SINUS PRESSURE: 1

## 2024-06-26 ASSESSMENT — LIFESTYLE VARIABLES: HISTORY_OF_SMOKING: I HAVE NEVER SMOKED

## 2024-06-26 NOTE — PATIENT INSTRUCTIONS
Drinking plenty of fluids and getting lots of rest. Chicken soup and hot beverages may help.    Trial of nasal irrigation with a Nettipot or squeeze bottle with sterile salt water.    Nasal spray corticosteroids (Flonase) may help in reducing the inflammatory response in the nasal passages and airways. Please try 2 sprays each nostril daily for 2 weeks.  If you have season allergies, please take a daily antihistamine of your choice, such as Zyrtec/Claritin/Allegra at bedtime.    Take Tylenol or Motrin/Aleve for sinus or ear pain.    If you have good blood pressure you can try pseudofed (you must ask the pharmacist for it and show ID).    Please call or return to the office if you are not feeling better in the next 3-4 days after starting treatment.    Over-the-counter cold and cough medications     Take Mucinex for cough, drink plenty of fluids with this medication and will help break up congestion making it easier to cough up     For cough can use honey (children ages 1 and up) in hot tea or hot water. I recommend putting this in an insulated cup and carrying it around throughout the day to sip on.  Have it at your bedside at night in case you wake up coughing.  You can also use honey cough drops (adults and older children).     Recommend nasal saline for use in children and adults.  Neti Joe can also be helpful.  (Never used tap water and a Neti pot.  Use distilled water.)     If you have plugged up congested ears or the feeling of fluid in your ears, you can use an over-the-counter nasal steroid spray like fluticasone (brand name Flonase) use 2 sprays into each nostril once or twice a day for 7 days.  This will help open up the eustachian tubes and allow the fluid to drain out of your ears.     Sleeping with your head/chest elevated can help with sinus drainage.     Adults only-can use nasal decongestant (like Afrin) at bedtime to open nasal passages so you can breathe through your nose while you sleep; avoid  using for longer than 3 days as this medication can become addicting.  Do not use if you have high blood pressure or high heart rate.     For severe pain or fever in adult-Tylenol (2 extra strength) or ibuprofen (3-4 tabs equals 600 to 800 mg) alternating as needed for pain.  Tylenol doses should be 6 to 8 hours apart and ibuprofen doses should be 6 to 8 hours apart.        Common cold medications for adults explained:     Mucinex-(generic name guaifenesin)-is an expectorant.  This will thin out all the thick mucus.  Must drink plenty of liquids for this medicine to work.     Dextromethorphan (brand name Delsym or DM)-this medicine is a cough suppressant     Honey in hot water or tea-this is a natural cough suppressant     Decongestant nasal spray- (eg: Afrin) use for temporary relief of nasal congestion-best when used at bedtime to open up nasal passages so that you are not forced to mouth breathe overnight.     Sudafed (generic name pseudoephedrine-this must be bought from the pharmacist) DO NOT use this medicine if you have high blood pressure as it can raise your blood pressure higher.  Do not use if you have any irregular heart rate.  This medicine can help clear congestion in your sinuses.

## 2024-06-26 NOTE — PROGRESS NOTES
On Demand Virtual Visit Patient Consent     This visit was completed via video conference. All issues as below were discussed and addressed but no physical exam was performed. If it was felt that the patient should be evaluated in clinic than they were directed there. The patient verbally consented to the visit.    An interactive audio and video telecommunication system which permits real time communications between the patient (at the originating site) and provider (at the distant site) was utilized to provide this telehealth service.   Verbal consent was requested and obtained from Paola Mahoney (or parent if under 18) 06/26/24 for a telehealth visit.   I have verbally confirmed with Paola Mahoney (or parent if under 18) that they are physically located in the [facility] during this virtual visit.    Subjective   Patient ID: Paola Mahoney is a 43 y.o. female who presents for Sinusitis.    Sinusitis  This is a new problem. Episode onset: 11dayago. The problem has been waxing and waning since onset. There has been no fever. Associated symptoms include congestion, coughing and sinus pressure. (Did covid test and it was negative, cough is non-productive    ) Past treatments include oral decongestants. The treatment provided mild relief.      Of note pt did have sinus surgery to left max and frontnal about 5   Review of Systems   HENT:  Positive for congestion and sinus pressure.    Respiratory:  Positive for cough.        Objective   There were no vitals taken for this visit.    Physical Exam pt seen from her work to be in no acute distress, non-productive cough present multiple times during visit    Assessment/Plan   Paola was seen today for sinusitis.  Diagnoses and all orders for this visit:  Acute non-recurrent frontal sinusitis  -     amoxicillin-pot clavulanate (Augmentin) 875-125 mg tablet; Take 1 tablet by mouth 2 times a day for 10 days.

## 2024-07-02 ENCOUNTER — APPOINTMENT (OUTPATIENT)
Dept: GASTROENTEROLOGY | Facility: CLINIC | Age: 44
End: 2024-07-02
Payer: COMMERCIAL

## 2024-07-02 DIAGNOSIS — R07.89 ATYPICAL CHEST PAIN: Primary | ICD-10-CM

## 2024-07-02 PROCEDURE — RXMED WILLOW AMBULATORY MEDICATION CHARGE

## 2024-07-02 PROCEDURE — 99212 OFFICE O/P EST SF 10 MIN: CPT | Performed by: INTERNAL MEDICINE

## 2024-07-02 RX ORDER — NORTRIPTYLINE HYDROCHLORIDE 25 MG/1
25 CAPSULE ORAL NIGHTLY
Qty: 90 CAPSULE | Refills: 1 | Status: SHIPPED | OUTPATIENT
Start: 2024-07-02 | End: 2024-12-29

## 2024-07-02 NOTE — PROGRESS NOTES
Subjective     History of Present Illness:       44 yo with history of chronic constipation, GERD, anxiety, migraine HA, undifferentiated connective tissue disease, seen in follow up . Previously doing well on pantoprazole.  Recently has been c/o ss chest pain that is not alleviated by PPI.  BID PPI caused abdominal pain. EGD negative.  Esophageal manometry negative.  Sx overall about the same, some improvement with consuming antiinflammatory diet workign with rheumatologist on.      Previous cardiac evaluation negative.           EGD 4/2024- Normal esophagus, normal stomach, FGPs, normal duodenum. Gastric bx and esophageal biopsies normal     Colonoscopy 4/2021 sigmoid inflammation biopsies c/w focal active colitis; remainder of colon biopsies normal. Hgb 10.5, stool pcr negative, fecal calprotectin 190. Given cipro/flagyl .     EGD/colon 4/2018 negative     Allergies  No Known Allergies    Medications  Current Outpatient Medications   Medication Instructions    amoxicillin-pot clavulanate (Augmentin) 875-125 mg tablet 1 tablet, oral, 2 times daily    cholecalciferol (VITAMIN D-3) 1,000 Units, oral, Daily    citalopram (CeleXA) 40 mg tablet TAKE 1 TABLET BY MOUTH ONCE DAILY    cyanocobalamin (VITAMIN B-12) 250 mcg, oral, Daily    fluticasone (Flonase) 50 mcg/actuation nasal spray 2 sprays, Each Nostril, Daily    hydroxychloroquine (PLAQUENIL) 400 mg, oral, Daily    norgestimate-ethinyl estradioL (Tri-Lo-Ángela) 0.18/0.215/0.25 mg-25 mcg tablet TAKE 1 TABLET BY MOUTH ONCE DAILY AS DIRECTED    pantoprazole (ProtoNix) 40 mg EC tablet TAKE 1 TABLET BY MOUTH ONCE DAILY    pilocarpine (Salagen) 7.5 mg tablet TAKE 1 TABLET BY MOUTH THREE TIMES A DAY    predniSONE (Deltasone) 10 mg tablet Take 4 tablets by mouth daily for 3 days, then 3 tablets daily for 3 days, then 2 tablets daily for 5 days, then 1.5 tablets daily for 7 days, then 1 tablet daily for 7 days, then 0.5 tablet daily for 7 days, then stop.        Objective    There were no vitals taken for this visit.   Physical Exam    Virtual visit       Assessment/Plan:      44 yo with history of chronic constipation, GERD, anxiety, migraine HA, undifferentiated connective tissue disease, seen in follow up for atypical chest pain.  Endoscopic evaluation and esophageal manometry negative. No change with PPI. Will give low dose TCA for suspected esophageal hypersensitivity, nortriptyline 25mg at bedtime . Follow up in 6-8 weeks.        Rachel Nicolas MD

## 2024-07-08 ENCOUNTER — PHARMACY VISIT (OUTPATIENT)
Dept: PHARMACY | Facility: CLINIC | Age: 44
End: 2024-07-08
Payer: COMMERCIAL

## 2024-08-19 PROCEDURE — RXMED WILLOW AMBULATORY MEDICATION CHARGE

## 2024-08-20 ENCOUNTER — PHARMACY VISIT (OUTPATIENT)
Dept: PHARMACY | Facility: CLINIC | Age: 44
End: 2024-08-20
Payer: COMMERCIAL

## 2024-08-25 NOTE — PROGRESS NOTES
Annual  Subjective   HPI:  Paola Mahoney is a 43 y.o. female  Patient's last menstrual period was 2024 (exact date). for annual exam.    Complaints:   stopped OCP 2024  Periods: regular   Dysmenorrhea:  none    GYNH:   Current contraceptive   condoms  History of abnormal Pap smear:     History of abnormal mammogram:    has had US    OB History          2    Para   1    Term   1       0    AB   1    Living   1         SAB   1    IAB   0    Ectopic   0    Multiple   0    Live Births   1                  Past Medical History:   Diagnosis Date    Acute maxillary sinusitis, unspecified 2017    Acute maxillary sinusitis    Acute streptococcal tonsillitis, unspecified 2019    Strep tonsillitis    Acute tonsillitis, unspecified 10/16/2019    Acute tonsillitis    Breast cyst, left     Elevated blood-pressure reading, without diagnosis of hypertension 2018    Elevated blood pressure, situational    Elevated blood-pressure reading, without diagnosis of hypertension 2018    Elevated blood pressure reading    Encounter for follow-up examination after completed treatment for conditions other than malignant neoplasm 2021    Hospital discharge follow-up    Encounter for gynecological examination (general) (routine) without abnormal findings 2017    Encounter for routine gynecological examination    Encounter for gynecological examination (general) (routine) without abnormal findings     Cervical smear, as part of routine gynecological examination    Encounter for initial prescription of contraceptive pills 2018    Encounter for BCP initial prescription    Encounter for other preprocedural examination 10/18/2018    Preoperative clearance    Encounter for preprocedural cardiovascular examination     Preoperative cardiovascular examination    Essential (primary) hypertension 2018    Benign essential hypertension    Fever, unspecified 05/15/2019    Fever in  adult    Fever, unspecified 08/02/2017    Fever with chills    Lumbago with sciatica, right side 10/17/2019    Right-sided low back pain with sciatica    Mixed hyperlipidemia 06/11/2018    Mixed hyperlipidemia    Other abnormal and inconclusive findings on diagnostic imaging of breast     Abnormal mammogram of left breast    Other acute nonsuppurative otitis media, recurrent, right ear 05/15/2019    Other recurrent acute nonsuppurative otitis media of right ear    Other long term (current) drug therapy 12/31/2021    Long-term use of Plaquenil    Other specified disorders of eye and adnexa 03/29/2017    Eye discharge    Otitis media, unspecified, bilateral 10/18/2018    Acute bilateral otitis media    Pain in arm, unspecified 09/09/2020    Arm pain    Pain in right foot 08/12/2020    Right foot pain    Pain in right shoulder 09/12/2020    Acute pain of right shoulder    Pain in right wrist 09/12/2020    Acute pain of right wrist    Pain in unspecified foot 05/07/2019    Foot pain    Personal history of diseases of the blood and blood-forming organs and certain disorders involving the immune mechanism     History of autoimmune disorder    Personal history of other diseases of the digestive system 10/14/2019    History of chronic constipation    Personal history of other diseases of the musculoskeletal system and connective tissue 08/02/2017    History of neck pain    Personal history of other diseases of the nervous system and sense organs 12/28/2018    History of eustachian tube dysfunction    Personal history of other diseases of the respiratory system 10/16/2019    History of sore throat    Personal history of other diseases of the respiratory system 05/15/2019    History of influenza    Personal history of other diseases of the respiratory system 12/28/2018    History of acute sinusitis    Personal history of other diseases of the respiratory system 02/13/2020    History of paranasal sinus congestion    Personal  history of other diseases of the respiratory system 2020    History of viral pharyngitis    Personal history of other diseases of the respiratory system 2020    History of sore throat    Personal history of other diseases of the respiratory system 2019    History of allergic rhinitis    Personal history of other diseases of the respiratory system 10/18/2018    History of chronic sinusitis    Personal history of other specified conditions 2018    History of dizziness    Personal history of other specified conditions 10/18/2018    History of palpitations    Personal history of other specified conditions 2018    History of chest pain at rest    Personal history of other specified conditions 2018    History of abdominal pain    Plantar fascial fibromatosis 10/02/2020    Plantar fasciitis of left foot    Sciatica, right side 2016    Sciatica of right side    Strain of muscle, fascia and tendon at neck level, initial encounter 2017    Strain of neck muscle, initial encounter    Strain of muscle, fascia and tendon of the posterior muscle group at thigh level, unspecified thigh, initial encounter 2021    Hamstring strain    Unspecified acute conjunctivitis, left eye 2017    Acute bacterial conjunctivitis of left eye    Unspecified nonsuppurative otitis media, bilateral 2017    Fluid level behind tympanic membrane of both ears    Unspecified nonsuppurative otitis media, right ear 2017    Fluid level behind tympanic membrane of right ear       Past Surgical History:   Procedure Laterality Date     SECTION, CLASSIC  2016     Section    CT ANGIO CORONARY ART WITH HEARTFLOW IF SCORE >30%  2023    CT HEART CORONARY ANGIOGRAM 2023 [facility] CT    DILATION AND CURETTAGE OF UTERUS  2015    Dilation And Curettage    MR HEAD ANGIO WO IV CONTRAST  2018    MR HEAD ANGIO WO IV CONTRAST 2018 [facility]    MR NECK  ANGIO WO IV CONTRAST  2018    MR NECK ANGIO WO IV CONTRAST 2018 [facility] ANCILLARY LEGACY    OTHER SURGICAL HISTORY  2018    Oral Surgery Tooth Extraction [place] [facility]    OTHER SURGICAL HISTORY  2019     section    OTHER SURGICAL HISTORY  2019    Esophagogastroduodenoscopy    OTHER SURGICAL HISTORY  2022    Sinus surgery    OTHER SURGICAL HISTORY  2022    Colonoscopy    REFRACTIVE SURGERY  2016    Corneal LASIK       Prior to Admission medications    Medication Sig Start Date End Date Taking? Authorizing Provider   cholecalciferol (Vitamin D-3) 25 MCG (1000 UT) tablet Take 1 tablet (1,000 Units) by mouth once daily.    Historical Provider, MD   citalopram (CeleXA) 40 mg tablet TAKE 1 TABLET BY MOUTH ONCE DAILY 24  Chester Olea DO   cyanocobalamin (Vitamin B-12) 250 mcg tablet Take 1 tablet (250 mcg) by mouth once daily.    Historical Provider, MD   fluticasone (Flonase) 50 mcg/actuation nasal spray Administer 2 sprays into each nostril once daily. 19   Historical Provider, MD   hydroxychloroquine (Plaquenil) 200 mg tablet Take 2 tablets (400 mg) by mouth once daily.  Patient not taking: Reported on 2024  Mady Chen MD   norgestimate-ethinyl estradioL (Tri-Lo-Ángela) 0.18/0.215/0.25 mg-25 mcg tablet TAKE 1 TABLET BY MOUTH ONCE DAILY AS DIRECTED 5/3/24 5/3/25  Na Logan MD   nortriptyline (Pamelor) 25 mg capsule Take 1 capsule (25 mg) by mouth once daily at bedtime. 24  Rachel Nicolas MD   pantoprazole (ProtoNix) 40 mg EC tablet TAKE 1 TABLET BY MOUTH ONCE DAILY 23  Rachel Nicolas MD   predniSONE (Deltasone) 10 mg tablet Take 4 tablets by mouth daily for 3 days, then 3 tablets daily for 3 days, then 2 tablets daily for 5 days, then 1.5 tablets daily for 7 days, then 1 tablet daily for 7 days, then 0.5 tablet daily for 7 days, then stop. 3/7/24      omeprazole (PriLOSEC) 40 mg DR  capsule Take 1 capsule (40 mg) by mouth once daily. Do not crush or chew. 2/12/24 5/13/24  Rachel Nicolas MD       Social History     Tobacco Use    Smoking status: Never     Passive exposure: Never    Smokeless tobacco: Never   Vaping Use    Vaping status: Never Used   Substance Use Topics    Alcohol use: Not Currently    Drug use: Never        Objective   /84   Wt 101 kg (222 lb 12.8 oz)   LMP 08/20/2024 (Exact Date)   BMI 42.10 kg/m²      General:   Alert and oriented, in no acute distress   Neck: Supple. No visible thyromegaly.    Breast/Axilla: Normal to palpation bilaterally without masses, skin changes, or nipple discharge.    Abdomen: Soft, non-tender, without masses or organomegaly   Vulva: Normal architecture without erythema, masses, or lesions.    Vagina: Normal mucosa without lesions, masses, or atrophy. No abnormal vaginal discharge.    Cervix: Normal without masses, lesions, or signs of cervicitis   Uterus: Normal, mobile, non-enlarged uterus   Adnexa: Normal without masses or lesions   Pelvic Floor normal   Psych Normal affect. Normal mood.      Assessment/Plan   Diagnoses and all orders for this visit:  Encounter for gynecological examination without abnormal finding  -     THINPREP PAP TEST  Screening mammogram for breast cancer  -     BI mammo bilateral screening tomosynthesis; Future  Routine annual  OB/GYN Preventive:     - Pap smear indicated every 3-5 years if normal and otherwise low risk   - Self breast exam monthly and clinical breast examination/mammogram yearly   - Screening colonoscopy recommended starting age 45, then Q3-10 years depending on testing and family history   - Genitourinary skin hygiene discussed.  - Diet/Weight management discussed.  - Exercise 30-60 minutes 3-5 times/day    Na Logan MD

## 2024-08-29 ENCOUNTER — OFFICE VISIT (OUTPATIENT)
Dept: OBSTETRICS AND GYNECOLOGY | Facility: CLINIC | Age: 44
End: 2024-08-29
Payer: COMMERCIAL

## 2024-08-29 VITALS — SYSTOLIC BLOOD PRESSURE: 126 MMHG | DIASTOLIC BLOOD PRESSURE: 84 MMHG | WEIGHT: 222.8 LBS | BODY MASS INDEX: 42.1 KG/M2

## 2024-08-29 DIAGNOSIS — Z12.31 SCREENING MAMMOGRAM FOR BREAST CANCER: ICD-10-CM

## 2024-08-29 DIAGNOSIS — Z01.419 ENCOUNTER FOR GYNECOLOGICAL EXAMINATION WITHOUT ABNORMAL FINDING: Primary | ICD-10-CM

## 2024-08-29 PROCEDURE — 1036F TOBACCO NON-USER: CPT | Performed by: OBSTETRICS & GYNECOLOGY

## 2024-08-29 PROCEDURE — 87624 HPV HI-RISK TYP POOLED RSLT: CPT | Performed by: OBSTETRICS & GYNECOLOGY

## 2024-08-29 PROCEDURE — 99396 PREV VISIT EST AGE 40-64: CPT | Performed by: OBSTETRICS & GYNECOLOGY

## 2024-08-29 ASSESSMENT — PATIENT HEALTH QUESTIONNAIRE - PHQ9
2. FEELING DOWN, DEPRESSED OR HOPELESS: NOT AT ALL
SUM OF ALL RESPONSES TO PHQ9 QUESTIONS 1 & 2: 0
1. LITTLE INTEREST OR PLEASURE IN DOING THINGS: NOT AT ALL

## 2024-08-29 ASSESSMENT — ENCOUNTER SYMPTOMS
LOSS OF SENSATION IN FEET: 0
OCCASIONAL FEELINGS OF UNSTEADINESS: 0
DEPRESSION: 0

## 2024-08-29 ASSESSMENT — PAIN SCALES - GENERAL: PAINLEVEL: 0-NO PAIN

## 2024-09-09 LAB
CYTOLOGY CMNT CVX/VAG CYTO-IMP: NORMAL
HPV HR 12 DNA GENITAL QL NAA+PROBE: NEGATIVE
HPV HR GENOTYPES PNL CVX NAA+PROBE: NEGATIVE
HPV16 DNA SPEC QL NAA+PROBE: NEGATIVE
HPV18 DNA SPEC QL NAA+PROBE: NEGATIVE
LAB AP HPV GENOTYPE QUESTION: YES
LAB AP HPV HR: NORMAL
LABORATORY COMMENT REPORT: NORMAL
PATH REPORT.TOTAL CANCER: NORMAL

## 2024-09-13 ENCOUNTER — APPOINTMENT (OUTPATIENT)
Dept: OPHTHALMOLOGY | Facility: CLINIC | Age: 44
End: 2024-09-13
Payer: COMMERCIAL

## 2024-09-23 ENCOUNTER — OFFICE VISIT (OUTPATIENT)
Dept: PRIMARY CARE | Facility: CLINIC | Age: 44
End: 2024-09-23
Payer: COMMERCIAL

## 2024-09-23 VITALS
SYSTOLIC BLOOD PRESSURE: 122 MMHG | RESPIRATION RATE: 18 BRPM | WEIGHT: 223.2 LBS | DIASTOLIC BLOOD PRESSURE: 78 MMHG | HEART RATE: 97 BPM | BODY MASS INDEX: 42.14 KG/M2 | OXYGEN SATURATION: 98 % | HEIGHT: 61 IN

## 2024-09-23 DIAGNOSIS — M77.8 THUMB TENDONITIS: ICD-10-CM

## 2024-09-23 DIAGNOSIS — D64.9 ANEMIA, UNSPECIFIED TYPE: ICD-10-CM

## 2024-09-23 DIAGNOSIS — Z00.00 ANNUAL PHYSICAL EXAM: Primary | ICD-10-CM

## 2024-09-23 LAB
ALBUMIN SERPL BCP-MCNC: 4.2 G/DL (ref 3.4–5)
ALP SERPL-CCNC: 83 U/L (ref 33–110)
ALT SERPL W P-5'-P-CCNC: 32 U/L (ref 7–45)
ANION GAP SERPL CALCULATED.3IONS-SCNC: 12 MMOL/L (ref 10–20)
AST SERPL W P-5'-P-CCNC: 23 U/L (ref 9–39)
BASOPHILS # BLD AUTO: 0.03 X10*3/UL (ref 0–0.1)
BASOPHILS NFR BLD AUTO: 0.4 %
BILIRUB SERPL-MCNC: 0.4 MG/DL (ref 0–1.2)
BUN SERPL-MCNC: 8 MG/DL (ref 6–23)
CALCIUM SERPL-MCNC: 9.2 MG/DL (ref 8.6–10.3)
CHLORIDE SERPL-SCNC: 103 MMOL/L (ref 98–107)
CHOLEST SERPL-MCNC: 165 MG/DL (ref 0–199)
CHOLEST/HDLC SERPL: 3.3 {RATIO}
CO2 SERPL-SCNC: 26 MMOL/L (ref 21–32)
CREAT SERPL-MCNC: 0.68 MG/DL (ref 0.5–1.05)
EGFRCR SERPLBLD CKD-EPI 2021: >90 ML/MIN/1.73M*2
EOSINOPHIL # BLD AUTO: 0.18 X10*3/UL (ref 0–0.7)
EOSINOPHIL NFR BLD AUTO: 2.1 %
ERYTHROCYTE [DISTWIDTH] IN BLOOD BY AUTOMATED COUNT: 13.1 % (ref 11.5–14.5)
GLUCOSE SERPL-MCNC: 89 MG/DL (ref 74–99)
HCT VFR BLD AUTO: 35.8 % (ref 36–46)
HDLC SERPL-MCNC: 49.6 MG/DL
HGB BLD-MCNC: 11.5 G/DL (ref 12–16)
IMM GRANULOCYTES # BLD AUTO: 0.04 X10*3/UL (ref 0–0.7)
IMM GRANULOCYTES NFR BLD AUTO: 0.5 % (ref 0–0.9)
LDLC SERPL CALC-MCNC: 88 MG/DL
LYMPHOCYTES # BLD AUTO: 2.23 X10*3/UL (ref 1.2–4.8)
LYMPHOCYTES NFR BLD AUTO: 26.3 %
MCH RBC QN AUTO: 27.4 PG (ref 26–34)
MCHC RBC AUTO-ENTMCNC: 32.1 G/DL (ref 32–36)
MCV RBC AUTO: 85 FL (ref 80–100)
MONOCYTES # BLD AUTO: 0.58 X10*3/UL (ref 0.1–1)
MONOCYTES NFR BLD AUTO: 6.8 %
NEUTROPHILS # BLD AUTO: 5.43 X10*3/UL (ref 1.2–7.7)
NEUTROPHILS NFR BLD AUTO: 63.9 %
NON HDL CHOLESTEROL: 115 MG/DL (ref 0–149)
NRBC BLD-RTO: 0 /100 WBCS (ref 0–0)
PLATELET # BLD AUTO: 421 X10*3/UL (ref 150–450)
POTASSIUM SERPL-SCNC: 4.2 MMOL/L (ref 3.5–5.3)
PROT SERPL-MCNC: 6.9 G/DL (ref 6.4–8.2)
RBC # BLD AUTO: 4.19 X10*6/UL (ref 4–5.2)
SODIUM SERPL-SCNC: 137 MMOL/L (ref 136–145)
TRIGL SERPL-MCNC: 138 MG/DL (ref 0–149)
TSH SERPL-ACNC: 1.85 MIU/L (ref 0.44–3.98)
VLDL: 28 MG/DL (ref 0–40)
WBC # BLD AUTO: 8.5 X10*3/UL (ref 4.4–11.3)

## 2024-09-23 PROCEDURE — 84443 ASSAY THYROID STIM HORMONE: CPT | Performed by: FAMILY MEDICINE

## 2024-09-23 PROCEDURE — 36415 COLL VENOUS BLD VENIPUNCTURE: CPT | Performed by: FAMILY MEDICINE

## 2024-09-23 PROCEDURE — 80053 COMPREHEN METABOLIC PANEL: CPT | Performed by: FAMILY MEDICINE

## 2024-09-23 PROCEDURE — 1036F TOBACCO NON-USER: CPT | Performed by: FAMILY MEDICINE

## 2024-09-23 PROCEDURE — 3008F BODY MASS INDEX DOCD: CPT | Performed by: FAMILY MEDICINE

## 2024-09-23 PROCEDURE — 85025 COMPLETE CBC W/AUTO DIFF WBC: CPT | Performed by: FAMILY MEDICINE

## 2024-09-23 PROCEDURE — 83540 ASSAY OF IRON: CPT | Performed by: FAMILY MEDICINE

## 2024-09-23 PROCEDURE — 80061 LIPID PANEL: CPT | Performed by: FAMILY MEDICINE

## 2024-09-23 PROCEDURE — 99396 PREV VISIT EST AGE 40-64: CPT | Performed by: FAMILY MEDICINE

## 2024-09-23 ASSESSMENT — PATIENT HEALTH QUESTIONNAIRE - PHQ9
2. FEELING DOWN, DEPRESSED OR HOPELESS: NOT AT ALL
1. LITTLE INTEREST OR PLEASURE IN DOING THINGS: NOT AT ALL
SUM OF ALL RESPONSES TO PHQ9 QUESTIONS 1 AND 2: 0

## 2024-09-23 ASSESSMENT — ENCOUNTER SYMPTOMS
OCCASIONAL FEELINGS OF UNSTEADINESS: 0
LOSS OF SENSATION IN FEET: 0
DEPRESSION: 0

## 2024-09-23 ASSESSMENT — PAIN SCALES - GENERAL: PAINLEVEL: 2

## 2024-09-23 NOTE — PROGRESS NOTES
"Subjective   Patient ID: Paola Mahoney is a 43 y.o. female who presents for Annual Exam (Right thumb has been having pain in the joints for a few weeks. ).    HPI     Review of Systems    Objective   /78   Pulse 97   Resp 18   Ht 1.549 m (5' 1\")   Wt 101 kg (223 lb 3.2 oz)   LMP 08/20/2024 (Exact Date)   SpO2 98%   BMI 42.17 kg/m²     Physical Exam  Vitals and nursing note reviewed.   Constitutional:       General: She is not in acute distress.  HENT:      Right Ear: Tympanic membrane and ear canal normal.      Left Ear: Tympanic membrane and ear canal normal.      Nose: Nose normal. No rhinorrhea.      Mouth/Throat:      Pharynx: Oropharynx is clear. No oropharyngeal exudate or posterior oropharyngeal erythema.      Comments: Dentition wnl  Eyes:      Extraocular Movements: Extraocular movements intact.      Conjunctiva/sclera: Conjunctivae normal.      Pupils: Pupils are equal, round, and reactive to light.   Neck:      Vascular: No carotid bruit.   Cardiovascular:      Rate and Rhythm: Normal rate and regular rhythm.      Heart sounds: Normal heart sounds. No murmur heard.  Pulmonary:      Breath sounds: Normal breath sounds. No wheezing or rhonchi.   Abdominal:      General: Bowel sounds are normal. There is no distension.      Palpations: Abdomen is soft. There is no mass.      Tenderness: There is no abdominal tenderness. There is no guarding or rebound.      Hernia: No hernia is present.   Musculoskeletal:         General: No swelling or tenderness. Normal range of motion.      Cervical back: Normal range of motion and neck supple.   Lymphadenopathy:      Cervical: No cervical adenopathy.   Skin:     General: Skin is warm.      Findings: No rash.   Neurological:      General: No focal deficit present.      Mental Status: She is alert.     Right thumb:  ttp dip joint    Assessment/Plan   Problem List Items Addressed This Visit    None  Visit Diagnoses         Codes    Annual physical exam    -  " Primary Z00.00    Relevant Orders    CBC and Auto Differential    Comprehensive Metabolic Panel    Lipid Panel    TSH with reflex to Free T4 if abnormal        Ice, splint, rest

## 2024-09-24 LAB
IRON SATN MFR SERPL: 14 % (ref 25–45)
IRON SERPL-MCNC: 49 UG/DL (ref 35–150)
TIBC SERPL-MCNC: 354 UG/DL (ref 240–445)
UIBC SERPL-MCNC: 305 UG/DL (ref 110–370)

## 2024-11-11 DIAGNOSIS — K21.9 GASTROESOPHAGEAL REFLUX DISEASE WITHOUT ESOPHAGITIS: ICD-10-CM

## 2024-11-11 PROCEDURE — RXMED WILLOW AMBULATORY MEDICATION CHARGE

## 2024-11-11 RX ORDER — PANTOPRAZOLE SODIUM 40 MG/1
40 TABLET, DELAYED RELEASE ORAL DAILY
Qty: 90 TABLET | Refills: 1 | Status: SHIPPED | OUTPATIENT
Start: 2024-11-11 | End: 2025-11-11

## 2024-11-13 ENCOUNTER — PHARMACY VISIT (OUTPATIENT)
Dept: PHARMACY | Facility: CLINIC | Age: 44
End: 2024-11-13
Payer: COMMERCIAL

## 2024-11-22 ENCOUNTER — HOSPITAL ENCOUNTER (OUTPATIENT)
Dept: RADIOLOGY | Facility: HOSPITAL | Age: 44
Discharge: HOME | End: 2024-11-22
Payer: COMMERCIAL

## 2024-11-22 VITALS — BODY MASS INDEX: 40.59 KG/M2 | WEIGHT: 215 LBS | HEIGHT: 61 IN

## 2024-11-22 DIAGNOSIS — R92.8 OTHER ABNORMAL AND INCONCLUSIVE FINDINGS ON DIAGNOSTIC IMAGING OF BREAST: ICD-10-CM

## 2024-11-22 DIAGNOSIS — Z12.31 SCREENING MAMMOGRAM FOR BREAST CANCER: ICD-10-CM

## 2024-11-22 PROCEDURE — 77062 BREAST TOMOSYNTHESIS BI: CPT

## 2024-11-22 PROCEDURE — 76642 ULTRASOUND BREAST LIMITED: CPT | Mod: RT

## 2024-11-22 PROCEDURE — 76983 USE EA ADDL TARGET LESION: CPT | Mod: RT

## 2024-11-25 ENCOUNTER — TELEPHONE (OUTPATIENT)
Dept: OBSTETRICS AND GYNECOLOGY | Facility: CLINIC | Age: 44
End: 2024-11-25
Payer: COMMERCIAL

## 2024-11-25 DIAGNOSIS — R92.8 ABNORMAL MAMMOGRAM: ICD-10-CM

## 2024-12-03 ENCOUNTER — APPOINTMENT (OUTPATIENT)
Dept: PODIATRY | Facility: CLINIC | Age: 44
End: 2024-12-03
Payer: COMMERCIAL

## 2024-12-03 DIAGNOSIS — M72.2 PLANTAR FASCIITIS: ICD-10-CM

## 2024-12-03 DIAGNOSIS — R26.2 DIFFICULTY WALKING: ICD-10-CM

## 2024-12-03 DIAGNOSIS — M79.672 FOOT PAIN, BILATERAL: Primary | ICD-10-CM

## 2024-12-03 DIAGNOSIS — M79.671 FOOT PAIN, BILATERAL: Primary | ICD-10-CM

## 2024-12-03 PROCEDURE — 99202 OFFICE O/P NEW SF 15 MIN: CPT | Performed by: PODIATRIST

## 2024-12-03 PROCEDURE — 1036F TOBACCO NON-USER: CPT | Performed by: PODIATRIST

## 2024-12-03 NOTE — PROGRESS NOTES
This is a 43 y.o. female new patient for foot pain    History of Present Illness:   Patient states they are here for foot pain  Last visit Sept 2021  Needs new rx for orthotics  States since making appt her original foot pain has resolved  She believes it may have been inflammatory  No trauma noted  No other pedal complaints    Past Medical History  Past Medical History:   Diagnosis Date    Acute maxillary sinusitis, unspecified 04/05/2017    Acute maxillary sinusitis    Acute streptococcal tonsillitis, unspecified 05/17/2019    Strep tonsillitis    Acute tonsillitis, unspecified 10/16/2019    Acute tonsillitis    Breast cyst, left     Elevated blood-pressure reading, without diagnosis of hypertension 05/18/2018    Elevated blood pressure, situational    Elevated blood-pressure reading, without diagnosis of hypertension 05/18/2018    Elevated blood pressure reading    Encounter for follow-up examination after completed treatment for conditions other than malignant neoplasm 02/17/2021    Hospital discharge follow-up    Encounter for gynecological examination (general) (routine) without abnormal findings 03/27/2017    Encounter for routine gynecological examination    Encounter for gynecological examination (general) (routine) without abnormal findings     Cervical smear, as part of routine gynecological examination    Encounter for initial prescription of contraceptive pills 04/05/2018    Encounter for BCP initial prescription    Encounter for other preprocedural examination 10/18/2018    Preoperative clearance    Encounter for preprocedural cardiovascular examination     Preoperative cardiovascular examination    Essential (primary) hypertension 06/11/2018    Benign essential hypertension    Fever, unspecified 05/15/2019    Fever in adult    Fever, unspecified 08/02/2017    Fever with chills    Lumbago with sciatica, right side 10/17/2019    Right-sided low back pain with sciatica    Mixed hyperlipidemia 06/11/2018     Mixed hyperlipidemia    Other abnormal and inconclusive findings on diagnostic imaging of breast     Abnormal mammogram of left breast    Other acute nonsuppurative otitis media, recurrent, right ear 05/15/2019    Other recurrent acute nonsuppurative otitis media of right ear    Other long term (current) drug therapy 12/31/2021    Long-term use of Plaquenil    Other specified disorders of eye and adnexa 03/29/2017    Eye discharge    Otitis media, unspecified, bilateral 10/18/2018    Acute bilateral otitis media    Pain in arm, unspecified 09/09/2020    Arm pain    Pain in right foot 08/12/2020    Right foot pain    Pain in right shoulder 09/12/2020    Acute pain of right shoulder    Pain in right wrist 09/12/2020    Acute pain of right wrist    Pain in unspecified foot 05/07/2019    Foot pain    Personal history of diseases of the blood and blood-forming organs and certain disorders involving the immune mechanism     History of autoimmune disorder    Personal history of other diseases of the digestive system 10/14/2019    History of chronic constipation    Personal history of other diseases of the musculoskeletal system and connective tissue 08/02/2017    History of neck pain    Personal history of other diseases of the nervous system and sense organs 12/28/2018    History of eustachian tube dysfunction    Personal history of other diseases of the respiratory system 10/16/2019    History of sore throat    Personal history of other diseases of the respiratory system 05/15/2019    History of influenza    Personal history of other diseases of the respiratory system 12/28/2018    History of acute sinusitis    Personal history of other diseases of the respiratory system 02/13/2020    History of paranasal sinus congestion    Personal history of other diseases of the respiratory system 02/13/2020    History of viral pharyngitis    Personal history of other diseases of the respiratory system 07/30/2020    History of  sore throat    Personal history of other diseases of the respiratory system 07/29/2019    History of allergic rhinitis    Personal history of other diseases of the respiratory system 10/18/2018    History of chronic sinusitis    Personal history of other specified conditions 08/29/2018    History of dizziness    Personal history of other specified conditions 10/18/2018    History of palpitations    Personal history of other specified conditions 05/18/2018    History of chest pain at rest    Personal history of other specified conditions 08/30/2018    History of abdominal pain    Plantar fascial fibromatosis 10/02/2020    Plantar fasciitis of left foot    Sciatica, right side 04/12/2016    Sciatica of right side    Strain of muscle, fascia and tendon at neck level, initial encounter 08/02/2017    Strain of neck muscle, initial encounter    Strain of muscle, fascia and tendon of the posterior muscle group at thigh level, unspecified thigh, initial encounter 01/13/2021    Hamstring strain    Unspecified acute conjunctivitis, left eye 03/29/2017    Acute bacterial conjunctivitis of left eye    Unspecified nonsuppurative otitis media, bilateral 04/05/2017    Fluid level behind tympanic membrane of both ears    Unspecified nonsuppurative otitis media, right ear 07/18/2017    Fluid level behind tympanic membrane of right ear       Medications and Allergies have been reviewed.    Review Of Systems:  GENERAL: No weight loss, malaise or fevers.  HEENT: Negative for frequent or significant headaches,   RESPIRATORY: Negative for cough, wheezing or shortness of breath.  CARDIOVASCULAR: Negative for chest pain, leg swelling or palpitations.    Physical Exam:  Patient is a pleasant, cooperative, well developed 43 y.o.  adult female. The patient is alert and oriented to time, place and person.   Patient has normal affect and mood.    Examination of Both Lower Extremities:   Vascular exam: Dorsalis pedis and Posterior Tibial pulses  palpable 2/4 bilateral. Capillary refill time less than 3 seconds b/l. Hair growth noted to digits. No edema noted. Skin temp warm to warm from proximal to distal b/l. No varicosities noted.     Neurologic exam: Vibratory, protective and proprioception intact b/l. No neurologic deficits noted.      Musculoskeletal exam: Muscle strength 5/5 for all major muscle groups tested in the lower extremity b/l. No gross deformities noted b/l.Bl Pain to palpation noted to medial calcaneal tubercle. Mild pain along medial band of plantar fascia. No pain with side to side heel compression.     Dermatologic exam: Nails 1-5 b/l are WNL. Webspaces 1-4 b/l are clean, dry and intact. No primary or secondary skin lesions noted. No open lesions or wounds noted at this time    1. Foot pain, bilateral  [employer]      2. Plantar fasciitis  [employer]      3. Difficulty walking  [employer]          Patient exam and eval  Discussed Plantar fasciitis/heel spur of the foot.  Discussed causes, symptoms, aggravating factors and treatment options  Recommend stiff supportive shoe gear  Shoes that do not twist or bend  Discussed injection to plantar fascia to help decrease pain - deferred at this time  Discussed ice, nsaids, seth hercules/biofreeze  Gave rx for new inserts  Patient to follow up if no improvement noted.   Pt in agreement to plan  All questions answered        Natalie Barriga DPM  [phone]  Option 2  Fax: [phone]

## 2024-12-17 ENCOUNTER — APPOINTMENT (OUTPATIENT)
Dept: RHEUMATOLOGY | Facility: CLINIC | Age: 44
End: 2024-12-17
Payer: COMMERCIAL

## 2025-01-22 ENCOUNTER — PHARMACY VISIT (OUTPATIENT)
Dept: PHARMACY | Facility: CLINIC | Age: 45
End: 2025-01-22
Payer: COMMERCIAL

## 2025-01-22 ENCOUNTER — OFFICE VISIT (OUTPATIENT)
Dept: URGENT CARE | Age: 45
End: 2025-01-22
Payer: COMMERCIAL

## 2025-01-22 VITALS
TEMPERATURE: 97.8 F | HEART RATE: 90 BPM | RESPIRATION RATE: 16 BRPM | DIASTOLIC BLOOD PRESSURE: 80 MMHG | HEIGHT: 61 IN | BODY MASS INDEX: 41.16 KG/M2 | SYSTOLIC BLOOD PRESSURE: 136 MMHG | WEIGHT: 218 LBS | OXYGEN SATURATION: 98 %

## 2025-01-22 DIAGNOSIS — J06.9 UPPER RESPIRATORY TRACT INFECTION, UNSPECIFIED TYPE: Primary | ICD-10-CM

## 2025-01-22 DIAGNOSIS — R68.89 FLU-LIKE SYMPTOMS: ICD-10-CM

## 2025-01-22 DIAGNOSIS — Z11.52 ENCOUNTER FOR SCREENING FOR COVID-19: ICD-10-CM

## 2025-01-22 LAB
POC RAPID INFLUENZA A: NEGATIVE
POC RAPID INFLUENZA B: NEGATIVE
POC SARS-COV-2 AG BINAX: NORMAL

## 2025-01-22 PROCEDURE — RXMED WILLOW AMBULATORY MEDICATION CHARGE

## 2025-01-22 RX ORDER — AZITHROMYCIN 250 MG/1
TABLET, FILM COATED ORAL
Qty: 6 TABLET | Refills: 0 | Status: SHIPPED | OUTPATIENT
Start: 2025-01-22 | End: 2025-01-27

## 2025-01-22 RX ORDER — PSYLLIUM HUSK 0.4 G
CAPSULE ORAL
COMMUNITY

## 2025-01-22 RX ORDER — BENZONATATE 200 MG/1
200 CAPSULE ORAL 3 TIMES DAILY PRN
Qty: 30 CAPSULE | Refills: 0 | Status: SHIPPED | OUTPATIENT
Start: 2025-01-22 | End: 2025-02-01

## 2025-01-22 ASSESSMENT — ENCOUNTER SYMPTOMS
SINUS COMPLAINT: 1
COUGH: 1

## 2025-01-22 NOTE — PROGRESS NOTES
Subjective   Patient ID: Paola Mahoney is a 44 y.o. female. They present today with a chief complaint of Sinus Problem (Drainage, since friday), Earache (Left ear, since friday), and Cough (Since friday).    History of Present Illness    Sinus Problem  Associated symptoms: congestion, cough and ear pain    Earache   Associated symptoms include coughing.   Cough  Associated symptoms include ear pain.     This is a 44-year-old female presents today complaining of sinus congestion with associated productive cough for the past 3 days.  She denies any fever or chills.  She denies any nausea or vomiting.  She denies any shortness of breath.  Past Medical History  Allergies as of 01/22/2025 - Reviewed 01/22/2025   Allergen Reaction Noted    Naproxen sodium Hives 01/22/2025       (Not in a hospital admission)       Past Medical History:   Diagnosis Date    Acute maxillary sinusitis, unspecified 04/05/2017    Acute maxillary sinusitis    Acute streptococcal tonsillitis, unspecified 05/17/2019    Strep tonsillitis    Acute tonsillitis, unspecified 10/16/2019    Acute tonsillitis    Breast cyst, left     Elevated blood-pressure reading, without diagnosis of hypertension 05/18/2018    Elevated blood pressure, situational    Elevated blood-pressure reading, without diagnosis of hypertension 05/18/2018    Elevated blood pressure reading    Encounter for follow-up examination after completed treatment for conditions other than malignant neoplasm 02/17/2021    Hospital discharge follow-up    Encounter for gynecological examination (general) (routine) without abnormal findings 03/27/2017    Encounter for routine gynecological examination    Encounter for gynecological examination (general) (routine) without abnormal findings     Cervical smear, as part of routine gynecological examination    Encounter for initial prescription of contraceptive pills 04/05/2018    Encounter for BCP initial prescription    Encounter for other  preprocedural examination 10/18/2018    Preoperative clearance    Encounter for preprocedural cardiovascular examination     Preoperative cardiovascular examination    Essential (primary) hypertension 06/11/2018    Benign essential hypertension    Fever, unspecified 05/15/2019    Fever in adult    Fever, unspecified 08/02/2017    Fever with chills    Lumbago with sciatica, right side 10/17/2019    Right-sided low back pain with sciatica    Mixed hyperlipidemia 06/11/2018    Mixed hyperlipidemia    Other abnormal and inconclusive findings on diagnostic imaging of breast     Abnormal mammogram of left breast    Other acute nonsuppurative otitis media, recurrent, right ear 05/15/2019    Other recurrent acute nonsuppurative otitis media of right ear    Other long term (current) drug therapy 12/31/2021    Long-term use of Plaquenil    Other specified disorders of eye and adnexa 03/29/2017    Eye discharge    Otitis media, unspecified, bilateral 10/18/2018    Acute bilateral otitis media    Pain in arm, unspecified 09/09/2020    Arm pain    Pain in right foot 08/12/2020    Right foot pain    Pain in right shoulder 09/12/2020    Acute pain of right shoulder    Pain in right wrist 09/12/2020    Acute pain of right wrist    Pain in unspecified foot 05/07/2019    Foot pain    Personal history of diseases of the blood and blood-forming organs and certain disorders involving the immune mechanism     History of autoimmune disorder    Personal history of other diseases of the digestive system 10/14/2019    History of chronic constipation    Personal history of other diseases of the musculoskeletal system and connective tissue 08/02/2017    History of neck pain    Personal history of other diseases of the nervous system and sense organs 12/28/2018    History of eustachian tube dysfunction    Personal history of other diseases of the respiratory system 10/16/2019    History of sore throat    Personal history of other diseases of  the respiratory system 05/15/2019    History of influenza    Personal history of other diseases of the respiratory system 2018    History of acute sinusitis    Personal history of other diseases of the respiratory system 2020    History of paranasal sinus congestion    Personal history of other diseases of the respiratory system 2020    History of viral pharyngitis    Personal history of other diseases of the respiratory system 2020    History of sore throat    Personal history of other diseases of the respiratory system 2019    History of allergic rhinitis    Personal history of other diseases of the respiratory system 10/18/2018    History of chronic sinusitis    Personal history of other specified conditions 2018    History of dizziness    Personal history of other specified conditions 10/18/2018    History of palpitations    Personal history of other specified conditions 2018    History of chest pain at rest    Personal history of other specified conditions 2018    History of abdominal pain    Plantar fascial fibromatosis 10/02/2020    Plantar fasciitis of left foot    Sciatica, right side 2016    Sciatica of right side    Strain of muscle, fascia and tendon at neck level, initial encounter 2017    Strain of neck muscle, initial encounter    Strain of muscle, fascia and tendon of the posterior muscle group at thigh level, unspecified thigh, initial encounter 2021    Hamstring strain    Unspecified acute conjunctivitis, left eye 2017    Acute bacterial conjunctivitis of left eye    Unspecified nonsuppurative otitis media, bilateral 2017    Fluid level behind tympanic membrane of both ears    Unspecified nonsuppurative otitis media, right ear 2017    Fluid level behind tympanic membrane of right ear       Past Surgical History:   Procedure Laterality Date     SECTION, CLASSIC  2016     Section    CT ANGIO  "CORONARY ART WITH HEARTFLOW IF SCORE >30%  2023    CT HEART CORONARY ANGIOGRAM 2023 [facility] CT    DILATION AND CURETTAGE OF UTERUS  2015    Dilation And Curettage    MR HEAD ANGIO WO IV CONTRAST  2018    MR HEAD ANGIO WO IV CONTRAST 2018 [facility] [place] [facility]    MR NECK ANGIO WO IV CONTRAST  2018    MR NECK ANGIO WO IV CONTRAST 2018 [facility] [place] [facility]    OTHER SURGICAL HISTORY  2018    Oral Surgery Tooth Extraction [facility]    OTHER SURGICAL HISTORY  2019     section    OTHER SURGICAL HISTORY  2019    Esophagogastroduodenoscopy    OTHER SURGICAL HISTORY  2022    Sinus surgery    OTHER SURGICAL HISTORY  2022    Colonoscopy    REFRACTIVE SURGERY  2016    Corneal LASIK        reports that she has never smoked. She has never been exposed to tobacco smoke. She has never used smokeless tobacco. She reports that she does not currently use alcohol. She reports that she does not use drugs.    Review of Systems  Review of Systems   HENT:  Positive for congestion and ear pain.    Respiratory:  Positive for cough.    All other systems reviewed and are negative.                                 Objective    Vitals:    25 0924   BP: 136/80   Pulse: 90   Resp: 16   Temp: 36.6 °C (97.8 °F)   TempSrc: Oral   SpO2: 98%   Weight: 98.9 kg (218 lb)   Height: 1.549 m (5' 1\")     Patient's last menstrual period was 2025 (exact date).    Physical Exam  The patient is awake alert oriented x 3 in no acute distress vital signs are stable.  She is afebrile.  Nares slightly congested.  No frontal max or sinus tenderness to percussion.  Throat nonerythematous.  TMs are intact.  Lungs with harsh breath sounds throughout.  Cardiovascular is regular rate and rhythm.  Procedures    Point of Care Test & Imaging Results from this visit  Results for orders placed or performed in visit on 25   POCT Influenza A/B manually resulted   Result Value Ref Range "    POC Rapid Influenza A Negative Negative    POC Rapid Influenza B Negative Negative   POCT Covid-19 Rapid Antigen   Result Value Ref Range    POC CECIL-COV-2 AG  Presumptive negative test for SARS-CoV-2 (no antigen detected)     Presumptive negative test for SARS-CoV-2 (no antigen detected)      No results found.    Diagnostic study results (if any) were reviewed by Edin Pyle DO.    Assessment/Plan   Allergies, medications, history, and pertinent labs/EKGs/Imaging reviewed by Edin Pyle DO.     Medical Decision Making      Orders and Diagnoses  Diagnoses and all orders for this visit:  Upper respiratory tract infection, unspecified type  -     azithromycin (Zithromax) 250 mg tablet; Take 2 tabs (500 mg) by mouth today, than 1 daily for 4 days.  -     benzonatate (Tessalon) 200 mg capsule; Take 1 capsule (200 mg) by mouth 3 times a day as needed for cough for up to 10 days. Do not crush or chew.  Encounter for screening for COVID-19  -     POCT Covid-19 Rapid Antigen  Flu-like symptoms  -     POCT Influenza A/B manually resulted      Medical Admin Record      Patient disposition: Home    Electronically signed by Edin Pyle DO  9:46 AM

## 2025-02-10 PROCEDURE — RXMED WILLOW AMBULATORY MEDICATION CHARGE

## 2025-02-12 ENCOUNTER — PHARMACY VISIT (OUTPATIENT)
Dept: PHARMACY | Facility: CLINIC | Age: 45
End: 2025-02-12
Payer: COMMERCIAL

## 2025-03-10 ASSESSMENT — ENCOUNTER SYMPTOMS
BACK PAIN: 1
HEADACHES: 1
WEAKNESS: 1
NUMBNESS: 1

## 2025-03-11 ENCOUNTER — OFFICE VISIT (OUTPATIENT)
Dept: PRIMARY CARE | Facility: CLINIC | Age: 45
End: 2025-03-11
Payer: COMMERCIAL

## 2025-03-11 ENCOUNTER — HOSPITAL ENCOUNTER (OUTPATIENT)
Dept: RADIOLOGY | Facility: CLINIC | Age: 45
Discharge: HOME | End: 2025-03-11
Payer: COMMERCIAL

## 2025-03-11 VITALS
BODY MASS INDEX: 42.48 KG/M2 | RESPIRATION RATE: 18 BRPM | HEART RATE: 82 BPM | SYSTOLIC BLOOD PRESSURE: 122 MMHG | TEMPERATURE: 98.2 F | WEIGHT: 224.8 LBS | DIASTOLIC BLOOD PRESSURE: 80 MMHG | OXYGEN SATURATION: 99 %

## 2025-03-11 DIAGNOSIS — G89.29 CHRONIC MIDLINE THORACIC BACK PAIN: Primary | ICD-10-CM

## 2025-03-11 DIAGNOSIS — Z00.00 WELL ADULT EXAM: ICD-10-CM

## 2025-03-11 DIAGNOSIS — R07.9 CHEST PAIN, UNSPECIFIED TYPE: ICD-10-CM

## 2025-03-11 DIAGNOSIS — M54.6 CHRONIC MIDLINE THORACIC BACK PAIN: ICD-10-CM

## 2025-03-11 DIAGNOSIS — G89.29 CHRONIC MIDLINE THORACIC BACK PAIN: ICD-10-CM

## 2025-03-11 DIAGNOSIS — M54.6 CHRONIC MIDLINE THORACIC BACK PAIN: Primary | ICD-10-CM

## 2025-03-11 PROCEDURE — 99213 OFFICE O/P EST LOW 20 MIN: CPT | Performed by: FAMILY MEDICINE

## 2025-03-11 PROCEDURE — RXMED WILLOW AMBULATORY MEDICATION CHARGE

## 2025-03-11 PROCEDURE — 1036F TOBACCO NON-USER: CPT | Performed by: FAMILY MEDICINE

## 2025-03-11 PROCEDURE — 93000 ELECTROCARDIOGRAM COMPLETE: CPT | Performed by: FAMILY MEDICINE

## 2025-03-11 PROCEDURE — 72072 X-RAY EXAM THORAC SPINE 3VWS: CPT

## 2025-03-11 RX ORDER — CYCLOBENZAPRINE HCL 10 MG
10 TABLET ORAL NIGHTLY PRN
Qty: 30 TABLET | Refills: 0 | Status: SHIPPED | OUTPATIENT
Start: 2025-03-11 | End: 2025-05-10

## 2025-03-11 ASSESSMENT — ENCOUNTER SYMPTOMS
LOSS OF SENSATION IN FEET: 0
DEPRESSION: 0
WEAKNESS: 1
BACK PAIN: 1
NUMBNESS: 1
HEADACHES: 1
OCCASIONAL FEELINGS OF UNSTEADINESS: 0

## 2025-03-11 ASSESSMENT — PATIENT HEALTH QUESTIONNAIRE - PHQ9
SUM OF ALL RESPONSES TO PHQ9 QUESTIONS 1 AND 2: 0
2. FEELING DOWN, DEPRESSED OR HOPELESS: NOT AT ALL
1. LITTLE INTEREST OR PLEASURE IN DOING THINGS: NOT AT ALL

## 2025-03-11 ASSESSMENT — PAIN SCALES - GENERAL: PAINLEVEL_OUTOF10: 0-NO PAIN

## 2025-03-11 NOTE — PROGRESS NOTES
Subjective   Patient ID: Paola Mahoney is a 44 y.o. female who presents for Pain (Pt having neck and upper back pain that she has had for awhile and not going away. ).    Back Pain  This is a chronic problem. The current episode started more than 1 year ago. The problem occurs daily. The problem has been gradually worsening since onset. The pain is present in the thoracic spine. The quality of the pain is described as burning, shooting and stabbing. The pain is at a severity of 7/10. The pain is The same all the time. The symptoms are aggravated by position. Stiffness is present All day. Associated symptoms include chest pain, headaches, numbness and weakness. Risk factors include obesity.    neck pain for many years.  Upper back over the last 2 years.      Review of Systems   Cardiovascular:  Positive for chest pain.   Musculoskeletal:  Positive for back pain.   Neurological:  Positive for weakness, numbness and headaches.       Objective   /80   Pulse 82   Temp 36.8 °C (98.2 °F)   Resp 18   Wt 102 kg (224 lb 12.8 oz)   SpO2 99%   BMI 42.48 kg/m²     Physical Exam  Constitutional:       General: She is not in acute distress.     Appearance: Normal appearance.   Cardiovascular:      Rate and Rhythm: Normal rate and regular rhythm.      Heart sounds: No murmur heard.  Pulmonary:      Breath sounds: Normal breath sounds. No wheezing.   Neurological:      Mental Status: She is alert.     Back: thoracic psm and spasm t2-t8 bl    Assessment/Plan   Problem List Items Addressed This Visit    None  Visit Diagnoses         Codes    Chronic midline thoracic back pain    -  Primary M54.6, G89.29    Relevant Medications    cyclobenzaprine (Flexeril) 10 mg tablet    Other Relevant Orders    XR thoracic spine 3 views    Referral to Physical Therapy    Chest pain, unspecified type     R07.9    Relevant Orders    ECG 12 Lead (Completed)    Well adult exam     Z00.00    Relevant Orders    ECG 12 Lead (Completed)

## 2025-03-13 ENCOUNTER — PHARMACY VISIT (OUTPATIENT)
Dept: PHARMACY | Facility: CLINIC | Age: 45
End: 2025-03-13
Payer: COMMERCIAL

## 2025-04-08 NOTE — PROGRESS NOTES
Physical Therapy Evaluation    Patient Name: Paola Mahoney  MRN: [id number]  Evaluation Date: 4/9/2025  Time Calculation  Start Time: 1635  Stop Time: 1715  Time Calculation (min): 40 min  PT Evaluation Time Entry  PT Evaluation (Low) Time Entry: 30           Insurance Information: 1). AUTH REQ. 3500 DED-NOT MET, 90% COVERAGE, 30V PT/OT, 5100 OOP-NOT MET, [facility] E[facility] , 2). NO AUTH, 25.00 COPAY, 100% COVERAGE, 6350 OOP, [employer]   Problem List Items Addressed This Visit    None  Visit Diagnoses         Codes    Chronic midline thoracic back pain    -  Primary M54.6, G89.29          Plan of Care to transfer to [facility] after 4/18/2025    Subjective   General: Patient is a 43 yo female with diagnosis of midback pain.  Patient with chief complaint of cervical/thoracic pain.  Patient reports >1 year history of cervical spine pain; symptoms have progressed to T-spine.  Patient with associated chest pain which has been worked up. Patient with some radicular (bilateral) fingers. Patient reports she is being worked up for ankylosing spondylitis.  Patient with history of LBP       Precautions:  Patient being worked up for ankylosing spondylitis    Relevant PMH:  History of cervical/lumbar pain      Pain:  T-spine- upper  At worst  9/10  Worse with  Standing after prolonged sitting  Physical activity    At best  0/10  Better with  rest     Home Living:  Occupation: full time job doing clinical instructor radiology  Work tasks: can be physical  Hobbies/activities: outdoors/bike riding    Prior Function Per Pt/Caregiver Report:  Limited secondary to pain       Imaging:  X-ray thoracic spine  No acute thoracic spine abnormality or significant degenerative   changes of the thoracic spine.       Grade 1 C3-4 and C4-5 anterolisthesis. Moderate appearing discogenic   degenerative changes at C5-6 and C6-7 with disc height loss and   osteophytes. Recommend dedicated cervical spine radiographs for   further  assessment.     MRI cervical spine 2022- narrowing foramina, greatest C5-6    Objective   Posture:  FHP  Increase lumbar lordosis     Range of Motion:  Cervical AROM Range   Flexion 25% limited- pain/tightness   Extension WNL   R rotation WNL   L rotation WNL        Strength:  Shoulder MMT L R   Flexion 4/5 4/5   Extension 4/5 4/5   Abduction 4/5 4/5   External Rotation 4-/5 4-/5   Internal Rotation 44/5 4/5           Flexibility:  WFL     Palpation:  Tender to palpation bilateral UT/upper T- spine     Outcome Measures:  Oswestry  12/50    Assessment  Pt is a 44 y.o. female who presents with impairments of cervical and thoracic spine pain. These impairments have led to functional limitations including pain with work/ADLs. Pt would benefit from skilled physical therapy intervention to improve above impairments and facilitate return to function.    Complexity of Evaluation: Low    Based on the history including personal factors and/or comorbidities, examination of body systems including body structures and function, activity limitations, and/or participation restrictions, as well as clinical presentation, patient meets criteria for above complexity evaluation.    Plan  Pending Authorization  1-2x/week  Up to 10 visits  Therapeutic exercises  Manual  Aqautics    Insurance Plan: Payor:  EMPLOYEE MEDICAL PLAN / Plan:  EMPLOYEE MEDICAL PLAN CONSUMER [employer] / Product Type: *No Product type* /     Plan for next visit:   Progress HEP  Door way stretching  Add manual     OP EDUCATION:  HEP  Posture awareness    Today's Treatment:  No treatment billed today as pt requires prior authorization  HEP to be completed daily, exercises include:  Child's pose  Cat/cow  Standing bilateral ER  Standing bilateral HABD    Goals:  STG(3 visits)  Patient to be independent with HEP    LTG(10 visits)-pending authorization  Oswestry to < 10% impaired  2.   Patient to perform ADLs with pain < 2/10  3.   Patient to  work full day with pain  <2/10  4 .  Patient to perform recreational activities with pain <2/10

## 2025-04-09 ENCOUNTER — EVALUATION (OUTPATIENT)
Dept: PHYSICAL THERAPY | Facility: CLINIC | Age: 45
End: 2025-04-09
Payer: COMMERCIAL

## 2025-04-09 DIAGNOSIS — G89.29 CHRONIC MIDLINE THORACIC BACK PAIN: Primary | ICD-10-CM

## 2025-04-09 DIAGNOSIS — M54.6 CHRONIC MIDLINE THORACIC BACK PAIN: Primary | ICD-10-CM

## 2025-04-09 PROCEDURE — 97161 PT EVAL LOW COMPLEX 20 MIN: CPT | Mod: GP

## 2025-04-09 PROCEDURE — 97110 THERAPEUTIC EXERCISES: CPT | Mod: GP

## 2025-04-14 DIAGNOSIS — Z13.6 SCREENING FOR HEART DISEASE: Primary | ICD-10-CM

## 2025-04-14 DIAGNOSIS — Z00.00 ROUTINE MEDICAL EXAM: ICD-10-CM

## 2025-04-15 ENCOUNTER — HOSPITAL ENCOUNTER (OUTPATIENT)
Dept: RADIOLOGY | Facility: HOSPITAL | Age: 45
Discharge: HOME | End: 2025-04-15
Payer: COMMERCIAL

## 2025-04-15 DIAGNOSIS — Z00.00 ROUTINE MEDICAL EXAM: ICD-10-CM

## 2025-04-15 PROCEDURE — 75571 CT HRT W/O DYE W/CA TEST: CPT

## 2025-04-18 DIAGNOSIS — R10.13 DYSPEPSIA: Primary | ICD-10-CM

## 2025-04-22 ENCOUNTER — TREATMENT (OUTPATIENT)
Dept: PHYSICAL THERAPY | Facility: CLINIC | Age: 45
End: 2025-04-22
Payer: COMMERCIAL

## 2025-04-22 DIAGNOSIS — M54.6 CHRONIC MIDLINE THORACIC BACK PAIN: ICD-10-CM

## 2025-04-22 DIAGNOSIS — G89.29 CHRONIC MIDLINE THORACIC BACK PAIN: ICD-10-CM

## 2025-04-22 PROCEDURE — 97110 THERAPEUTIC EXERCISES: CPT | Mod: GP | Performed by: PHYSICAL THERAPIST

## 2025-04-22 PROCEDURE — 97140 MANUAL THERAPY 1/> REGIONS: CPT | Mod: GP | Performed by: PHYSICAL THERAPIST

## 2025-04-22 NOTE — PROGRESS NOTES
Physical Therapy Treatment    Patient Name: Paola Mahoney  MRN: [id number]  Encounter date: 4/22/2025    Time Calculation  Start Time: 0330  Stop Time: 0415  Time Calculation (min): 45 min  PT Therapeutic Procedures Time Entry  Manual Therapy Time Entry: 30  Therapeutic Exercise Time Entry: 15    Visit # 2 of 5  Visits/Dates Authorized: 4/21/25: EVAL ONLY-Auth Rcvd 5 visits 4/9-6/30 CPTs 62905 [id number]    1). EVAL ONLY, AUTH REQ. 3500 DED-NOT MET, 90% COVERAGE, 30V PT/OT, 5100 OOP-NOT MET,  EMP , 2). NO AUTH, 25.00 COPAY, 100% COVERAGE, 6350 OOP, [facility] HEALTH PLAN     Current Problem:   Problem List Items Addressed This Visit    None  Visit Diagnoses         Codes      Chronic midline thoracic back pain     M54.6, G89.29               Subjective   General:    Pt reports minimal to no change after IE. Has been working on HEP    Pre-Treatment Symptoms:        Objective   Vitals:             Treatments:      Therapeutic Exercise 40042:   Cat cow to brayden pose x 10  LTR  Open book      Manual Therapy 68678:   SOR, manual traction, scap mobs, education on posture, kiaphosis leads to forward head, rounded shoulders and increased lordosis        HEP / Access Codes:     Assessment:    Did not get to anything for R shoulder except that increased kiaphosis leads to rounded shoulders.   Post-Treatment Symptoms:        Plan: Continue to work on posture. Add R shoulder to manual - scap and GH jt mobs. Start IR>ER         Goals:     STG(3 visits)  Patient to be independent with HEP     LTG(10 visits)-pending authorization  Oswestry to < 10% impaired  2.   Patient to perform ADLs with pain < 2/10  3.   Patient to  work full day with pain <2/10  4 .  Patient to perform recreational activities with pain <2/10

## 2025-04-28 NOTE — PROGRESS NOTES
Physical Therapy Treatment    Patient Name: Paola Mahoney  MRN: [id number]  Encounter date: 4/29/2025    Time Calculation  Start Time: 1530  Stop Time: 1614  Time Calculation (min): 44 min  PT Therapeutic Procedures Time Entry  Manual Therapy Time Entry: 25  Therapeutic Exercise Time Entry: 20    Visit # 3 of 5  Visits/Dates Authorized: 4/21/25: EVAL ONLY-Auth Rcvd 5 visits 4/9-6/30 CPTs 87064 [id number]    1). EVAL ONLY, AUTH REQ. 3500 DED-NOT MET, 90% COVERAGE, 30V PT/OT, 5100 OOP-NOT MET,  EMP , 2). NO AUTH, 25.00 COPAY, 100% COVERAGE, 6350 OOP, [facility] HEALTH PLAN     Current Problem:   Problem List Items Addressed This Visit    None  Visit Diagnoses         Codes      Chronic midline thoracic back pain     M54.6, G89.29          Subjective   General:     Patient reports pain with ADLs that require bending, stretching, and reaching and stated manual interventions last visit increased cervical soreness after.      Pre-Treatment Symptoms:   5/10    Objective         Increased TTP and muscle tension in B shoulders, R >L.  Decreased pain free R shoulder AAROM into abduction.     Treatments:     Therapeutic Exercise 56989:   Cat cow to brayden pose x 10  Open book x 10 L/R  Shoulder flexion AAROM x 20, BUE legs to overhead, w/ cane  Shoulder abduction AAROM x 20, w/ cane- R shoulder pain     Manual Therapy 16592:   IASTM w/ softball to posterior shoulders, UT, and PsP medial to scapula, focused more on R vs L  Supine SOR, manual traction, scap mobs, BUE over wedge    HEP / Access Codes:   Cat cow to brayden pose x 10  LTR  Open book    Assessment:   Good response to IASTM with softball focusing R more than L and SOR, no report of increased soreness following intervention. Good response to supine exercises added to improve shoulder ROM and decrease shoulder muscle tension. R shoulder pain reported with AAROM abduction, decreased after activity.  LTR not performed during visit do to increased lumbar  "pain reported with attempt.     Post-Treatment Symptoms:   \"It feels looser\"    Plan: Continue to work on posture. Add R shoulder to manual - scap and GH jt mobs. Start IR>ER    Goals:     STG(3 visits)  Patient to be independent with HEP     LTG(10 visits)-pending authorization  Oswestry to < 10% impaired  2.   Patient to perform ADLs with pain < 2/10  3.   Patient to  work full day with pain <2/10  4 .  Patient to perform recreational activities with pain <2/10  "

## 2025-04-29 ENCOUNTER — TREATMENT (OUTPATIENT)
Dept: PHYSICAL THERAPY | Facility: CLINIC | Age: 45
End: 2025-04-29
Payer: COMMERCIAL

## 2025-04-29 DIAGNOSIS — M54.6 CHRONIC MIDLINE THORACIC BACK PAIN: ICD-10-CM

## 2025-04-29 DIAGNOSIS — G89.29 CHRONIC MIDLINE THORACIC BACK PAIN: ICD-10-CM

## 2025-04-29 PROCEDURE — 97140 MANUAL THERAPY 1/> REGIONS: CPT | Mod: CQ,GP | Performed by: SPECIALIST/TECHNOLOGIST

## 2025-04-29 PROCEDURE — 97110 THERAPEUTIC EXERCISES: CPT | Mod: CQ,GP | Performed by: SPECIALIST/TECHNOLOGIST

## 2025-04-30 ENCOUNTER — APPOINTMENT (OUTPATIENT)
Dept: PHYSICAL THERAPY | Facility: CLINIC | Age: 45
End: 2025-04-30
Payer: COMMERCIAL

## 2025-05-08 ENCOUNTER — TREATMENT (OUTPATIENT)
Dept: PHYSICAL THERAPY | Facility: CLINIC | Age: 45
End: 2025-05-08
Payer: COMMERCIAL

## 2025-05-08 DIAGNOSIS — M54.6 CHRONIC MIDLINE THORACIC BACK PAIN: ICD-10-CM

## 2025-05-08 DIAGNOSIS — G89.29 CHRONIC MIDLINE THORACIC BACK PAIN: ICD-10-CM

## 2025-05-08 PROCEDURE — 97140 MANUAL THERAPY 1/> REGIONS: CPT | Mod: GP,CQ

## 2025-05-08 PROCEDURE — 97110 THERAPEUTIC EXERCISES: CPT | Mod: GP,CQ

## 2025-05-08 NOTE — PROGRESS NOTES
"Physical Therapy Treatment    Patient Name: Paola Mahoney  MRN: [id number]  Encounter date: 5/8/2025    Time Calculation  Start Time: 0730  Stop Time: 0815  Time Calculation (min): 45 min  PT Therapeutic Procedures Time Entry  Manual Therapy Time Entry: 25  Therapeutic Exercise Time Entry: 15    Visit # 4 of 5  Visits/Dates Authorized: 4/21/25: EVAL ONLY-Auth Rcvd 5 visits 4/9-6/30 CPTs [id number] [phone]    1). EVAL ONLY, AUTH REQ. 3500 DED-NOT MET, 90% COVERAGE, 30V PT/OT, 5100 OOP-NOT MET,  EMP , 2). NO AUTH, 25.00 COPAY, 100% COVERAGE, 6350 OOP, [facility] HEALTH PLAN     Current Problem:   Problem List Items Addressed This Visit    None  Visit Diagnoses         Codes      Chronic midline thoracic back pain     M54.6, G89.29          Subjective   General:   Patient went to gym yesterday \"My chest was hurting on the treadmill but not when doing legs.\"   \"I have been cleared by cardiac.\"     Pre-Treatment Symptoms:   6/10 R shoulder     Objective   PROM abd 90 degrees supine RUE   Limited cervical ROM with rotation, lateral flexion PROM    Treatments:   Therapeutic Exercise 30663:   Pulley flexion and scaption 2 min ea   Thoracic self mob with towel x 10   Posture self correction x 10     Manual Therapy 46312:   supine with wedge   -Occipital releases  -Gentle manual cervical traction  -Upper trap stretch  -Cervical ROM  -B upper trap stretch   Side lying   - scap and GH jt mobs.  -STM thoracic  Supine   -PROM in scaption IR>ER  P-ROM abduction     HEP / Access Codes:   Access Code: [email or web address]  URL: [email or web address]  Date: 05/08/2025  Prepared by: Evi Eason    Exercises  - Child's Pose Stretch  - 1 x daily - 5 x weekly - 1 sets - 3 reps - 20-30 sec  hold  - Child's Pose with Sidebending  - 1 x daily - 5 x weekly - 1 sets - 3 reps - 20-30 sec  hold  - Sidelying Open Book Thoracic Lumbar Rotation and Extension  - 1 x daily - 5 x weekly - 2 sets - 10 reps - 3-5 sec hold  - Supine Lower " "Trunk Rotation  - 1 x daily - 5 x weekly - 3 sets - 10 reps  - Correct Seated Posture  - 1 x daily - 5 x weekly - 3 sets - 10 reps - 5-10 sec  hold  - Seated Thoracic Lumbar Extension  - 1 x daily - 5 x weekly - 1-2 sets - 10 reps - 3- sec  hold    Has patient been compliant with HEP? Yes     Assessment:   Focus manual this session and added right scapular mobilizations, PROM IR and ER.  HEP instructed and updated. Handouts issued.     Pain end of session:  \"A little better.\"     Plan:     Continue with current POC/no changes  Plan for next visit:   Progress HEP  Door way stretching  Add manual    Assessment of current progress against goals:  Progressing toward functional goals     STG(3 visits)  Patient to be independent with HEP     LTG(10 visits)-pending authorization  Oswestry to < 10% impaired  2.   Patient to perform ADLs with pain < 2/10  3.   Patient to  work full day with pain <2/10  4 .  Patient to perform recreational activities with pain <2/10  "

## 2025-05-12 DIAGNOSIS — F41.9 ANXIETY: ICD-10-CM

## 2025-05-12 DIAGNOSIS — K21.9 GASTROESOPHAGEAL REFLUX DISEASE WITHOUT ESOPHAGITIS: ICD-10-CM

## 2025-05-12 PROCEDURE — RXMED WILLOW AMBULATORY MEDICATION CHARGE

## 2025-05-12 RX ORDER — CITALOPRAM 40 MG/1
40 TABLET, FILM COATED ORAL DAILY
Qty: 90 TABLET | Refills: 3 | Status: SHIPPED | OUTPATIENT
Start: 2025-05-12 | End: 2026-05-12

## 2025-05-12 RX ORDER — PANTOPRAZOLE SODIUM 40 MG/1
40 TABLET, DELAYED RELEASE ORAL DAILY
Qty: 90 TABLET | Refills: 0 | Status: SHIPPED | OUTPATIENT
Start: 2025-05-12 | End: 2026-05-12

## 2025-05-13 ENCOUNTER — PHARMACY VISIT (OUTPATIENT)
Dept: PHARMACY | Facility: CLINIC | Age: 45
End: 2025-05-13
Payer: COMMERCIAL

## 2025-05-19 ENCOUNTER — TREATMENT (OUTPATIENT)
Dept: PHYSICAL THERAPY | Facility: CLINIC | Age: 45
End: 2025-05-19
Payer: COMMERCIAL

## 2025-05-19 DIAGNOSIS — M54.6 CHRONIC MIDLINE THORACIC BACK PAIN: ICD-10-CM

## 2025-05-19 DIAGNOSIS — G89.29 CHRONIC MIDLINE THORACIC BACK PAIN: ICD-10-CM

## 2025-05-19 PROCEDURE — 97140 MANUAL THERAPY 1/> REGIONS: CPT | Mod: GP | Performed by: PHYSICAL THERAPIST

## 2025-05-19 PROCEDURE — 97110 THERAPEUTIC EXERCISES: CPT | Mod: GP | Performed by: PHYSICAL THERAPIST

## 2025-05-19 NOTE — PROGRESS NOTES
"Physical Therapy Treatment    Patient Name: Paola Mahoney  MRN: [id number]  Encounter date: 5/19/2025    Time Calculation  Start Time: 0730  Stop Time: 0815  Time Calculation (min): 45 min  PT Therapeutic Procedures Time Entry  Manual Therapy Time Entry: 25  Therapeutic Exercise Time Entry: 13    Visit # 5 of 5  Visits/Dates Authorized: 4/21/25: EVAL ONLY-Auth Rcvd 5 visits 4/9-6/30 CPTs 01735 [id number] [id number] [phone]    1). EVAL ONLY, AUTH REQ. 3500 DED-NOT MET, 90% COVERAGE, 30V PT/OT, 5100 OOP-NOT MET,  EMP , 2). NO AUTH, 25.00 COPAY, 100% COVERAGE, 6350 OOP, [facility] HEALTH PLAN     Current Problem:   Problem List Items Addressed This Visit    None  Visit Diagnoses         Codes      Chronic midline thoracic back pain     M54.6, G89.29          Subjective   General:   Patient went to gym yesterday \"My chest was hurting on the treadmill but not when doing legs.\"   \"I have been cleared by cardiac.\"     Pre-Treatment Symptoms:   6/10 R shoulder with lifting the arm  ADLs - 5-6/10  Recreational activities 7-8/10  Pt also reports sternal pain with upper thoracic pain. Pain 5-6/10 when she starts walking after she has been sitting ( or driving ) > 1/2 hour.  She has had cardiac work up, and had no + findings.   She reports that her LBP is bad when she has to stand - as with washing the dishes.    Objective   PROM abd 90 degrees supine RUE   Limited cervical ROM with rotation, lateral flexion PROM  Other Measures  Oswestry Disablity Index (JANNA): 10   Palpation:  Pt tender to palpation over R subacromial space and Bi tendon    Posture:  Increased kiaphosis, forward head and flat mid t spine.  Rounded shoulders with increased lordosis.    R shoulder strength Flexion and abduction 4/5 MMT with pain over subacromial space.         Treatments:   Therapeutic Exercise 98736:   DNP today:  Pulley flexion and scaption 2 min ea   Thoracic self mob with towel x 10   Posture self correction x 10     Manual Therapy 03599: " "  supine with wedge   -Occipital releases  -Gentle manual cervical traction  -Upper trap stretch  -Cervical ROM  -B upper trap stretch   Side lying   - scap and GH jt mobs.  -STM thoracic  Supine   -PROM in scaption IR>ER  P-ROM abduction     HEP / Access Codes:   Access Code: [email or web address]  URL: [email or web address]  Date: 05/08/2025  Prepared by: Evi Eason    Exercises  - Child's Pose Stretch  - 1 x daily - 5 x weekly - 1 sets - 3 reps - 20-30 sec  hold  - Child's Pose with Sidebending  - 1 x daily - 5 x weekly - 1 sets - 3 reps - 20-30 sec  hold  - Sidelying Open Book Thoracic Lumbar Rotation and Extension  - 1 x daily - 5 x weekly - 2 sets - 10 reps - 3-5 sec hold  - Supine Lower Trunk Rotation  - 1 x daily - 5 x weekly - 3 sets - 10 reps  - Correct Seated Posture  - 1 x daily - 5 x weekly - 3 sets - 10 reps - 5-10 sec  hold  - Seated Thoracic Lumbar Extension  - 1 x daily - 5 x weekly - 1-2 sets - 10 reps - 3- sec  hold    Has patient been compliant with HEP? Yes     Assessment:  Pt has improved her Oswestry score from 24% impairment at IE to 22% impairment currently. She has lowered pain levels from 9/10 at IE 5-6/10 with ADLs and working a full day.  She is making progress in PT but has not met goals. She would benefit from continued PT.     Pain end of session:  \"A little better.\"     Plan:     Continue with current POC/no changes  Plan for next visit:   Progress HEP  Door way stretching  Add manual    Assessment of current progress against goals:  Progressing toward functional goals     STG(3 visits)  Patient to be independent with HEP     LTG(10 visits)-pending authorization  Oswestry to < 10% impaired  2.   Patient to perform ADLs with pain < 2/10  3.   Patient to  work full day with pain <2/10  4 .  Patient to perform recreational activities with pain <2/10  "

## 2025-05-22 ENCOUNTER — APPOINTMENT (OUTPATIENT)
Dept: PHYSICAL THERAPY | Facility: CLINIC | Age: 45
End: 2025-05-22
Payer: COMMERCIAL

## 2025-05-22 ENCOUNTER — TELEPHONE (OUTPATIENT)
Dept: OBSTETRICS AND GYNECOLOGY | Facility: CLINIC | Age: 45
End: 2025-05-22

## 2025-05-22 ENCOUNTER — HOSPITAL ENCOUNTER (OUTPATIENT)
Dept: RADIOLOGY | Facility: HOSPITAL | Age: 45
Discharge: HOME | End: 2025-05-22
Payer: COMMERCIAL

## 2025-05-22 DIAGNOSIS — R92.8 OTHER ABNORMAL AND INCONCLUSIVE FINDINGS ON DIAGNOSTIC IMAGING OF BREAST: ICD-10-CM

## 2025-05-22 DIAGNOSIS — R92.8 ABNORMAL MAMMOGRAM: ICD-10-CM

## 2025-05-22 PROCEDURE — 76982 USE 1ST TARGET LESION: CPT

## 2025-05-22 PROCEDURE — 76642 ULTRASOUND BREAST LIMITED: CPT | Mod: RT

## 2025-06-03 ENCOUNTER — TREATMENT (OUTPATIENT)
Dept: PHYSICAL THERAPY | Facility: CLINIC | Age: 45
End: 2025-06-03
Payer: COMMERCIAL

## 2025-06-03 ENCOUNTER — APPOINTMENT (OUTPATIENT)
Dept: PHYSICAL THERAPY | Facility: CLINIC | Age: 45
End: 2025-06-03
Payer: COMMERCIAL

## 2025-06-03 DIAGNOSIS — M54.6 CHRONIC MIDLINE THORACIC BACK PAIN: ICD-10-CM

## 2025-06-03 DIAGNOSIS — G89.29 CHRONIC MIDLINE THORACIC BACK PAIN: ICD-10-CM

## 2025-06-03 PROCEDURE — 97140 MANUAL THERAPY 1/> REGIONS: CPT | Mod: GP,CQ

## 2025-06-03 NOTE — PROGRESS NOTES
"Physical Therapy Treatment    Patient Name: Paola Mahoney  MRN: [id number]  Encounter date: 6/3/2025    Time Calculation  Start Time: 1700  Stop Time: 1730  Time Calculation (min): 30 min  PT Therapeutic Procedures Time Entry  Manual Therapy Time Entry: 27    Visit # 6 of 5  Visits/Dates Authorized: 4/21/25: EVAL ONLY-Auth Rcvd 5 visits 4/9-6/30 CPTs 89724 [id number] [id number] [id number] [id number]    1). EVAL ONLY, AUTH REQ. 3500 DED-NOT MET, 90% COVERAGE, 30V PT/OT, 5100 OOP-NOT MET,  EMP , 2). NO AUTH, 25.00 COPAY, 100% COVERAGE, 6350 OOP, [facility] HEALTH PLAN     Current Problem:   Problem List Items Addressed This Visit    None  Visit Diagnoses         Codes      Chronic midline thoracic back pain     M54.6, G89.29          Subjective   General:   \"This morning it (chest) was painful. I had to stop walking.\" \"Tightness.\"   \"I had quite a few days on vacation in [place] it was not hurting as much. I was not excreting as much, because of the elevation change.     Pre-Treatment Symptoms:   0/10 rest     Objective   Cervical ROM tight  Upper trap tight   Myofascial tissue limited thoracic      Treatments:   Manual Therapy 27793:   supine with wedge   -Occipital releases  -Gentle manual cervical traction  -Upper trap stretch  -Cervical ROM  -B upper trap stretch   Pec stretch   Side lying   - scap and GH jt mobs. Bilateral   Prone   MFR C7 and down into thoracic region  vertebral rocking C7 to L5  Vertebral AP C7 to L5      HEP / Access Codes:   Access Code: [email or web address]  URL: [email or web address]  Date: 05/08/2025  Prepared by: Evi Eason    Exercises  - Child's Pose Stretch  - 1 x daily - 5 x weekly - 1 sets - 3 reps - 20-30 sec  hold  - Child's Pose with Sidebending  - 1 x daily - 5 x weekly - 1 sets - 3 reps - 20-30 sec  hold  - Sidelying Open Book Thoracic Lumbar Rotation and Extension  - 1 x daily - 5 x weekly - 2 sets - 10 reps - 3-5 sec hold  - Supine Lower Trunk Rotation  - 1 x daily - 5 x weekly - 3 sets - 10 " reps  - Correct Seated Posture  - 1 x daily - 5 x weekly - 3 sets - 10 reps - 5-10 sec  hold  - Seated Thoracic Lumbar Extension  - 1 x daily - 5 x weekly - 1-2 sets - 10 reps - 3- sec  hold    Has patient been compliant with HEP? Yes     Assessment:  Continued with manual and added prone MFR,  vertebral rocking and AP. The patient did not present with pain nor leave with pain. The patient reports feeling loose and instructed to assess response to new manual techniques.     Pain end of session: 0    Plan:   Consider first rib mobilizations and continue with POC       Assessment of current progress against goals:  Progressing toward functional goals     STG(3 visits)  Patient to be independent with HEP     LTG(10 visits)-pending authorization  Oswestry to < 10% impaired  2.   Patient to perform ADLs with pain < 2/10  3.   Patient to  work full day with pain <2/10  4 .  Patient to perform recreational activities with pain <2/10

## 2025-06-05 ENCOUNTER — TREATMENT (OUTPATIENT)
Dept: PHYSICAL THERAPY | Facility: CLINIC | Age: 45
End: 2025-06-05
Payer: COMMERCIAL

## 2025-06-05 DIAGNOSIS — G89.29 CHRONIC MIDLINE THORACIC BACK PAIN: ICD-10-CM

## 2025-06-05 DIAGNOSIS — M54.6 CHRONIC MIDLINE THORACIC BACK PAIN: ICD-10-CM

## 2025-06-05 PROCEDURE — 97110 THERAPEUTIC EXERCISES: CPT | Mod: GP | Performed by: PHYSICAL THERAPIST

## 2025-06-05 PROCEDURE — 97140 MANUAL THERAPY 1/> REGIONS: CPT | Mod: GP | Performed by: PHYSICAL THERAPIST

## 2025-06-05 NOTE — PROGRESS NOTES
"Physical Therapy Treatment    Patient Name: Paola Mahoney  MRN: [id number]  Encounter date: 6/5/2025    Time Calculation  Start Time: 0500  Stop Time: 0550  Time Calculation (min): 50 min  PT Therapeutic Procedures Time Entry  Manual Therapy Time Entry: 35  Therapeutic Exercise Time Entry: 8    Visit # 7 of 5  Visits/Dates Authorized: 4/21/25: EVAL ONLY-Auth Rcvd 5 visits 4/9-6/30 CPTs 12239 28008 12504 [id number] [phone]    1). EVAL ONLY, AUTH REQ. 3500 DED-NOT MET, 90% COVERAGE, 30V PT/OT, 5100 OOP-NOT MET,  EMP , 2). NO AUTH, 25.00 COPAY, 100% COVERAGE, 6350 OOP, [facility] HEALTH PLAN     Current Problem:   Problem List Items Addressed This Visit    None  Visit Diagnoses         Codes      Chronic midline thoracic back pain     M54.6, G89.29          Subjective   General:   \"This morning it (chest) was painful. I had to stop walking.\" \"Tightness.\"   \"I had quite a few days on vacation in [place] it was not hurting as much. I was not excreting as much, because of the elevation change.     Pre-Treatment Symptoms:   0/10 rest     Objective   Cervical ROM tight  Upper trap tight   Myofascial tissue limited thoracic      Treatments:   Manual Therapy 58749:   supine with wedge   -Occipital releases  -Gentle manual cervical traction  -Upper trap stretch  -Cervical ROM  -B upper trap stretch   Pec stretch   Side lying   - scap and GH jt mobs. Bilateral   Prone   MFR C7 and down into thoracic region  vertebral rocking C7 to L5  Vertebral AP C7 to L5      HEP / Access Codes:   Access Code: [email or web address]  URL: [email or web address]  Date: 05/08/2025  Prepared by: Evi Eason    Exercises  - Child's Pose Stretch  - 1 x daily - 5 x weekly - 1 sets - 3 reps - 20-30 sec  hold  - Child's Pose with Sidebending  - 1 x daily - 5 x weekly - 1 sets - 3 reps - 20-30 sec  hold  - Sidelying Open Book Thoracic Lumbar Rotation and Extension  - 1 x daily - 5 x weekly - 2 sets - 10 reps - 3-5 sec hold  - Supine Lower Trunk Rotation  - 1 x " daily - 5 x weekly - 3 sets - 10 reps  - Correct Seated Posture  - 1 x daily - 5 x weekly - 3 sets - 10 reps - 5-10 sec  hold  - Seated Thoracic Lumbar Extension  - 1 x daily - 5 x weekly - 1-2 sets - 10 reps - 3- sec  hold    Has patient been compliant with HEP? Yes   Educated pt on posture - improving upper ts pine - and the difficulty of the same.   Assessment:  Pain associated with upper tspine flexion and thickness in upper trap/pecs with rounded shoudlers      Continued with manual and added prone MFR,  vertebral rocking and AP. The patient did not present with pain nor leave with pain. The patient reports feeling loose and instructed to assess response to new manual techniques.   Right leg longer  Pain end of session: 0    Plan:   Consider first rib mobilizations and continue with POC       Assessment of current progress against goals:  Progressing toward functional goals     STG(3 visits)  Patient to be independent with HEP     LTG(10 visits)-pending authorization  Oswestry to < 10% impaired  2.   Patient to perform ADLs with pain < 2/10  3.   Patient to  work full day with pain <2/10  4 .  Patient to perform recreational activities with pain <2/10

## 2025-06-10 ENCOUNTER — TREATMENT (OUTPATIENT)
Dept: PHYSICAL THERAPY | Facility: CLINIC | Age: 45
End: 2025-06-10
Payer: COMMERCIAL

## 2025-06-10 DIAGNOSIS — M54.6 CHRONIC MIDLINE THORACIC BACK PAIN: ICD-10-CM

## 2025-06-10 DIAGNOSIS — G89.29 CHRONIC MIDLINE THORACIC BACK PAIN: ICD-10-CM

## 2025-06-10 PROCEDURE — 97140 MANUAL THERAPY 1/> REGIONS: CPT | Mod: GP,CQ

## 2025-06-10 PROCEDURE — 97110 THERAPEUTIC EXERCISES: CPT | Mod: GP,CQ

## 2025-06-10 NOTE — PROGRESS NOTES
"Physical Therapy Treatment    Patient Name: Paola Mahoney  MRN: [id number]  Encounter date: 6/10/2025    Time Calculation  Start Time: 1700  Stop Time: 1745  Time Calculation (min): 45 min  PT Therapeutic Procedures Time Entry  Manual Therapy Time Entry: 25  Therapeutic Exercise Time Entry: 15    Visit # 8 of 5  Visits/Dates Authorized: 4/21/25: EVAL ONLY-Auth Rcvd 5 visits 4/9-6/30 CPTs 52959 99385 22960 [id number] [phone] [id number]    1). EVAL ONLY, AUTH REQ. 3500 DED-NOT MET, 90% COVERAGE, 30V PT/OT, 5100 OOP-NOT MET,  EMP , 2). NO AUTH, 25.00 COPAY, 100% COVERAGE, 6350 OOP, [facility] HEALTH PLAN     Current Problem:   Problem List Items Addressed This Visit    None  Visit Diagnoses         Codes      Chronic midline thoracic back pain     M54.6, G89.29          Subjective   General:   \"I am just in a lot of pain today,     Pre-Treatment Symptoms:   5/10 rest   8/10 whole body     Objective       Treatments:   Therapeutic exercise   UBE 2 min fwd and retro     Pulley flexion and scaption 1 min        Manual Therapy   supine with wedge   -Occipital releases  -Gentle manual cervical traction  -Upper trap stretch  -Cervical ROM  -B upper trap stretch   Pec stretch   Side lying   - scap and GH jt mobs. Bilateral     Therapeutic exercise   Self mobilization for 1st rib x 10 5 sec hold on end range     Manual Therapy 09324:   Prone   MFR C7 and down into thoracic region  vertebral rocking C7 to L5  Vertebral AP C7 to L5      HEP / Access Codes:   Access Code: [email or web address]  URL: [email or web address]  Date: 05/08/2025  Prepared by: Evi Eason    Exercises  - Child's Pose Stretch  - 1 x daily - 5 x weekly - 1 sets - 3 reps - 20-30 sec  hold  - Child's Pose with Sidebending  - 1 x daily - 5 x weekly - 1 sets - 3 reps - 20-30 sec  hold  - Sidelying Open Book Thoracic Lumbar Rotation and Extension  - 1 x daily - 5 x weekly - 2 sets - 10 reps - 3-5 sec hold  - Supine Lower Trunk Rotation  - 1 x daily - 5 x weekly - 3 sets - 10 " "reps  - Correct Seated Posture  - 1 x daily - 5 x weekly - 3 sets - 10 reps - 5-10 sec  hold  - Seated Thoracic Lumbar Extension  - 1 x daily - 5 x weekly - 1-2 sets - 10 reps - 3- sec  hold    Has patient been compliant with HEP? Yes   Educated pt on posture - improving upper ts pine - and the difficulty of the same.     Assessment:  Pain down slightly after manual applied to improve myofascial, ligament and tendon restriction of shoulder girdle.       Pain end of session: \"Little better. 4/10 chest 7/10 whole body     Plan:       Assessment of current progress against goals:  Progressing toward functional goals     STG(3 visits)  Patient to be independent with HEP     LTG(10 visits)-pending authorization  Oswestry to < 10% impaired  2.   Patient to perform ADLs with pain < 2/10  3.   Patient to  work full day with pain <2/10  4 .  Patient to perform recreational activities with pain <2/10  "

## 2025-06-12 ENCOUNTER — TREATMENT (OUTPATIENT)
Dept: PHYSICAL THERAPY | Facility: CLINIC | Age: 45
End: 2025-06-12
Payer: COMMERCIAL

## 2025-06-12 DIAGNOSIS — M54.6 CHRONIC MIDLINE THORACIC BACK PAIN: ICD-10-CM

## 2025-06-12 DIAGNOSIS — G89.29 CHRONIC MIDLINE THORACIC BACK PAIN: ICD-10-CM

## 2025-06-12 PROCEDURE — 97140 MANUAL THERAPY 1/> REGIONS: CPT | Mod: GP | Performed by: PHYSICAL THERAPIST

## 2025-06-12 PROCEDURE — 97110 THERAPEUTIC EXERCISES: CPT | Mod: GP | Performed by: PHYSICAL THERAPIST

## 2025-06-12 NOTE — PROGRESS NOTES
Physical Therapy Treatment    Patient Name: Paola Mahoney  MRN: [id number]  Encounter date: 6/12/2025    Time Calculation  Start Time: 0500  Stop Time: 0555  Time Calculation (min): 55 min  PT Therapeutic Procedures Time Entry  Therapeutic Exercise Time Entry: 25  Manual Therapy Time Entry: 30    Visit # 9 of 5  Visits/Dates Authorized: 4/21/25: EVAL ONLY-Auth Rcvd 5 visits 4/9-6/30 CPTs 30970 39038 88737 [id number] [phone]    1). EVAL ONLY, AUTH REQ. 3500 DED-NOT MET, 90% COVERAGE, 30V PT/OT, 5100 OOP-NOT MET,  EMP ,   2). NO AUTH, 25.00 COPAY, 100% COVERAGE, 6350 OOP, [facility] HEALTH PLAN     EVAL ONLY-Auth Rcvd 10 visits 4/9-7/31 CPTs 54877 52338 [id number] [phone]       1). EVAL ONLY, AUTH REQ. 3500 DED-NOT MET, 90% COVERAGE, 30V PT/OT, 5100 OOP-NOT MET,  EMP , 2). NO AUTH, 25.00 COPAY, 100% COVERAGE, 6350 OOP, [facility] PL[facility]   Current Problem:   Problem List Items Addressed This Visit    None  Visit Diagnoses         Codes      Chronic midline thoracic back pain     M54.6, G89.29          Subjective   General:   Pt reports that her pain is primarily when she walks to her car. It is better if her posture was good through out the day, and worse if she was in a bad posture.  She has the pain in sternum and in chest.  When she walks on the treadmill, she gets the pain, but then improves after a few minutes.    Pre-Treatment Symptoms:   5/10 rest   8/10 whole body     Objective       Treatments:   Therapeutic exercise   Rode stationary bike with UE supported ( recumbant) to eliminate any stress on neck or upper quart. Increased resistance and was able to reproduce the chest and neck symptoms, and began as she began to increase RR.      Pulley flexion and scaption 1 min        Manual Therapy   supine with wedge   -Occipital releases  -Gentle manual cervical traction  -Upper trap stretch  -Cervical ROM  -B upper trap stretch   Pec stretch   Side lying   - scap and GH jt mobs. Bilateral     Therapeutic  "exercise   Self mobilization for 1st rib x 10 5 sec hold on end range   Open books  Square block breathing with 4 sec at each leg. Added deep breat for rib mob with open book  Manual Therapy 99797:   Prone   MFR C7 and down into thoracic region  vertebral rocking C7 to L5  Vertebral AP C7 to L5      HEP / Access Codes:   Access Code: [email or web address]  URL: [email or web address]  Date: 05/08/2025  Prepared by: Evi Eason    Exercises  - Child's Pose Stretch  - 1 x daily - 5 x weekly - 1 sets - 3 reps - 20-30 sec  hold  - Child's Pose with Sidebending  - 1 x daily - 5 x weekly - 1 sets - 3 reps - 20-30 sec  hold  - Sidelying Open Book Thoracic Lumbar Rotation and Extension  - 1 x daily - 5 x weekly - 2 sets - 10 reps - 3-5 sec hold  - Supine Lower Trunk Rotation  - 1 x daily - 5 x weekly - 3 sets - 10 reps  - Correct Seated Posture  - 1 x daily - 5 x weekly - 3 sets - 10 reps - 5-10 sec  hold  - Seated Thoracic Lumbar Extension  - 1 x daily - 5 x weekly - 1-2 sets - 10 reps - 3- sec  hold    Has patient been compliant with HEP? Yes   Educated pt on posture - improving upper ts pine - and the difficulty of the same.     Assessment:  Sx related to breathing at start of exercise      Pain end of session: \"Little better. 4/10 chest 7/10 whole body     Plan: focus on breathing - rib mobility, diaphragmatic breathing, ISTM an dDN to SCMs       Assessment of current progress against goals:  Progressing toward functional goals     STG(3 visits)  Patient to be independent with HEP     LTG(10 visits)-pending authorization  Oswestry to < 10% impaired  2.   Patient to perform ADLs with pain < 2/10  3.   Patient to  work full day with pain <2/10  4 .  Patient to perform recreational activities with pain <2/10  "

## 2025-06-17 ENCOUNTER — TREATMENT (OUTPATIENT)
Dept: PHYSICAL THERAPY | Facility: CLINIC | Age: 45
End: 2025-06-17
Payer: COMMERCIAL

## 2025-06-17 DIAGNOSIS — G89.29 CHRONIC MIDLINE THORACIC BACK PAIN: ICD-10-CM

## 2025-06-17 DIAGNOSIS — M54.6 CHRONIC MIDLINE THORACIC BACK PAIN: ICD-10-CM

## 2025-06-17 PROCEDURE — 97140 MANUAL THERAPY 1/> REGIONS: CPT | Mod: GP | Performed by: PHYSICAL THERAPIST

## 2025-06-17 NOTE — PROGRESS NOTES
Physical Therapy Treatment    Patient Name: Paola Mahoney  MRN: [id number]  Encounter date: 6/17/2025    Time Calculation  Start Time: 0455  Stop Time: 0550  Time Calculation (min): 55 min  PT Therapeutic Procedures Time Entry  Manual Therapy Time Entry: 40    Visit # 10 of 5  Visits/Dates Authorized: 4/21/25: EVAL ONLY-Auth Rcvd 5 visits 4/9-6/30 CPTs 76782 32480 [phone] [id number]    1). EVAL ONLY, AUTH REQ. 3500 DED-NOT MET, 90% COVERAGE, 30V PT/OT, 5100 OOP-NOT MET,  EMP ,   2). NO AUTH, 25.00 COPAY, 100% COVERAGE, 6350 OOP, [facility]     EVAL ONLY-Auth Rcvd 10 visits 4/9-7/31 CPTs 15589 29952 [phone] [id number]       1). EVAL ONLY, AUTH REQ. 3500 DED-NOT MET, 90% COVERAGE, 30V PT/OT, 5100 OOP-NOT MET,  EMP , 2). NO AUTH, 25.00 COPAY, 100% COVERAGE, 6350 OOP, [facility]   EVAL ONLY-Auth Rcvd 10 visits 4/9-7/31 CPTs 05159 06459 [id number] [phone] [phone]       1). EVAL ONLY, AUTH REQ. 3500 DED-NOT MET, 90% COVERAGE, 30V PT/OT, 5100 OOP-NOT MET,  EMP , 2). NO AUTH, 25.00 COPAY, 100% COVERAGE, 6350 OOP, [facility]     Current Problem:   Problem List Items Addressed This Visit    None  Visit Diagnoses         Codes      Chronic midline thoracic back pain     M54.6, G89.29          Subjective   General:   Pt reports that he parascap muscles are tight and spasming since last visit - different from the breathing related sx, this is mor constant and started Friday.     Pre-Treatment Symptoms:   3/10 rest       Objective       Treatments:   Therapeutic exercise   Rode stationary bike with UE supported ( recumbant) to eliminate any stress on neck or upper quart. Increased resistance and was able to reproduce the chest and neck symptoms, and began as she began to increase RR.      Pulley flexion and scaption 1 min        Manual Therapy   supine with wedge   -Occipital releases  -Gentle manual cervical traction  -Upper trap stretch  -Cervical ROM  -B upper trap stretch   Pec  "stretch   Side lying   - scap and GH jt mobs. Bilateral     Therapeutic exercise   Self mobilization for 1st rib x 10 5 sec hold on end range   Open books  Square block breathing with 4 sec at each leg. Added deep breat for rib mob with open book  Manual Therapy [id number]:   Prone   MFR C7 and down into thoracic region  vertebral rocking C7 to L5  Vertebral AP C7 to L5      HEP / Access Codes:   Access Code: [email or web address]  URL: [email or web address]  Date: 05/08/2025  Prepared by: Evi Eason    Exercises  - Child's Pose Stretch  - 1 x daily - 5 x weekly - 1 sets - 3 reps - 20-30 sec  hold  - Child's Pose with Sidebending  - 1 x daily - 5 x weekly - 1 sets - 3 reps - 20-30 sec  hold  - Sidelying Open Book Thoracic Lumbar Rotation and Extension  - 1 x daily - 5 x weekly - 2 sets - 10 reps - 3-5 sec hold  - Supine Lower Trunk Rotation  - 1 x daily - 5 x weekly - 3 sets - 10 reps  - Correct Seated Posture  - 1 x daily - 5 x weekly - 3 sets - 10 reps - 5-10 sec  hold  - Seated Thoracic Lumbar Extension  - 1 x daily - 5 x weekly - 1-2 sets - 10 reps - 3- sec  hold    Has patient been compliant with HEP? Yes   Educated pt on posture - improving upper ts pine - and the difficulty of the same.     Assessment:  Sx related to breathing at start of exercise      Pain end of session: \"Little better. 4/10 chest 7/10 whole body     Plan: focus on breathing - rib mobility, diaphragmatic breathing, ISTM an dDN to SCMs       Assessment of current progress against goals:  Progressing toward functional goals     STG(3 visits)  Patient to be independent with HEP     LTG(10 visits)-pending authorization  Oswestry to < 10% impaired  2.   Patient to perform ADLs with pain < 2/10  3.   Patient to  work full day with pain <2/10  4 .  Patient to perform recreational activities with pain <2/10  "

## 2025-07-14 ENCOUNTER — APPOINTMENT (OUTPATIENT)
Dept: PRIMARY CARE | Facility: CLINIC | Age: 45
End: 2025-07-14
Payer: COMMERCIAL

## 2025-07-14 ENCOUNTER — PHARMACY VISIT (OUTPATIENT)
Dept: PHARMACY | Facility: CLINIC | Age: 45
End: 2025-07-14
Payer: COMMERCIAL

## 2025-07-14 ENCOUNTER — TELEMEDICINE (OUTPATIENT)
Dept: PRIMARY CARE | Facility: CLINIC | Age: 45
End: 2025-07-14
Payer: COMMERCIAL

## 2025-07-14 DIAGNOSIS — J01.10 ACUTE NON-RECURRENT FRONTAL SINUSITIS: Primary | ICD-10-CM

## 2025-07-14 PROCEDURE — 99213 OFFICE O/P EST LOW 20 MIN: CPT | Performed by: NURSE PRACTITIONER

## 2025-07-14 PROCEDURE — 1036F TOBACCO NON-USER: CPT | Performed by: NURSE PRACTITIONER

## 2025-07-14 PROCEDURE — RXMED WILLOW AMBULATORY MEDICATION CHARGE

## 2025-07-14 RX ORDER — AMOXICILLIN AND CLAVULANATE POTASSIUM 875; 125 MG/1; MG/1
1 TABLET, FILM COATED ORAL 2 TIMES DAILY
Qty: 20 TABLET | Refills: 0 | Status: SHIPPED | OUTPATIENT
Start: 2025-07-14 | End: 2025-07-24

## 2025-07-14 ASSESSMENT — LIFESTYLE VARIABLES
HISTORY_OF_SMOKING: I HAVE NEVER SMOKED
HISTORY_OF_SMOKING: I HAVE NEVER SMOKED

## 2025-07-14 NOTE — PROGRESS NOTES
Paola Mahoney is a 44 y.o. female who presents virtually today for an acute sick visit    I performed this visit using real-time telehealth tools, including an audio/video connection between Paola Mahoeny  and myself, Sarah Frias CNP,  within the state of [place].  Consent has been obtained for this visit.  I have verbally confirmed with Paola Mahoney (or parent if under 18) that they are physically located in the [facility] during this virtual visit.  Telemedicine appropriate evaluation completed.  Unable to perform complete physical exam due to virtual visit.      Chief Complaint   Patient presents with    Facial Pain       Symptoms: Facial pain/pressure @ frontal and maxillary sinuses  Nasal pressure with minimal green drainage     Denies fevers/chills     Symptom onset has been acute for a time period of 2 week(s).     Severity is described as moderate.     Course of her symptoms over time is constant and not improving     Has taken: Allergy meds and Flonase     No Known Allergies      Review of Systems  ROS was completed and all systems are negative with the exception of what was noted in the the HPI.       Objective   Vitals:  There were no vitals taken for this visit.      Current Outpatient Medications   Medication Instructions    amoxicillin-clavulanate (Augmentin) 875-125 mg tablet 1 tablet, oral, 2 times daily    citalopram (CeleXA) 40 mg tablet TAKE 1 TABLET BY MOUTH ONCE DAILY    pantoprazole (PROTONIX) 40 mg, oral, Daily, Last refill until seen in office.    turmeric-turmeric root extract 450-50 mg capsule Take by mouth.         Physical Exam  Pulmonary:      Effort: Pulmonary effort is normal.   Neurological:      Mental Status: She is alert and oriented to person, place, and time.   Psychiatric:         Mood and Affect: Mood normal.         Behavior: Behavior normal.         Thought Content: Thought content normal.       Assessment & Plan  Acute non-recurrent frontal sinusitis  Rx Augmentin x  10 days  Advil cold/sinus for headache/congestion  Drink plenty of fluids  Wash hands frequently   Nasal saline as needed for nasal congestion   May try Flonase for increased nasal swelling.  Instill 2 squirts each nostril once daily x 2 weeks.    If you have good blood pressure, you can try Sudafed for 3-5 days     Orders:    amoxicillin-clavulanate (Augmentin) 875-125 mg tablet; Take 1 tablet by mouth 2 times a day for 10 days.

## 2025-07-14 NOTE — PATIENT INSTRUCTIONS
Acute non-recurrent frontal sinusitis  Rx Augmentin x 10 days  Advil cold/sinus for headache/congestion  Drink plenty of fluids  Wash hands frequently   Nasal saline as needed for nasal congestion   May try Flonase for increased nasal swelling.  Instill 2 squirts each nostril once daily x 2 weeks.    If you have good blood pressure, you can try Sudafed for 3-5 days     Orders:    amoxicillin-clavulanate (Augmentin) 875-125 mg tablet; Take 1 tablet by mouth 2 times a day for 10 days.    FU WITH PCP AS NEEDED

## 2025-07-18 ENCOUNTER — APPOINTMENT (OUTPATIENT)
Dept: PHYSICAL THERAPY | Facility: CLINIC | Age: 45
End: 2025-07-18
Payer: COMMERCIAL

## 2025-07-23 ENCOUNTER — APPOINTMENT (OUTPATIENT)
Dept: CARDIOLOGY | Facility: HOSPITAL | Age: 45
End: 2025-07-23
Payer: COMMERCIAL

## 2025-08-04 DIAGNOSIS — K21.9 GASTROESOPHAGEAL REFLUX DISEASE WITHOUT ESOPHAGITIS: ICD-10-CM

## 2025-08-04 PROCEDURE — RXMED WILLOW AMBULATORY MEDICATION CHARGE

## 2025-08-07 ENCOUNTER — PHARMACY VISIT (OUTPATIENT)
Dept: PHARMACY | Facility: CLINIC | Age: 45
End: 2025-08-07
Payer: COMMERCIAL

## 2025-08-08 PROCEDURE — RXMED WILLOW AMBULATORY MEDICATION CHARGE

## 2025-08-08 RX ORDER — PANTOPRAZOLE SODIUM 40 MG/1
40 TABLET, DELAYED RELEASE ORAL DAILY
Qty: 90 TABLET | Refills: 0 | Status: SHIPPED | OUTPATIENT
Start: 2025-08-08 | End: 2026-08-08

## 2025-08-11 ENCOUNTER — PHARMACY VISIT (OUTPATIENT)
Dept: PHARMACY | Facility: CLINIC | Age: 45
End: 2025-08-11
Payer: COMMERCIAL

## 2025-08-18 ENCOUNTER — APPOINTMENT (OUTPATIENT)
Dept: CARDIOLOGY | Facility: CLINIC | Age: 45
End: 2025-08-18
Payer: COMMERCIAL

## 2025-08-18 VITALS
DIASTOLIC BLOOD PRESSURE: 82 MMHG | HEART RATE: 90 BPM | SYSTOLIC BLOOD PRESSURE: 135 MMHG | WEIGHT: 232.9 LBS | OXYGEN SATURATION: 96 % | BODY MASS INDEX: 43.97 KG/M2 | HEIGHT: 61 IN

## 2025-08-18 DIAGNOSIS — K21.9 GASTROESOPHAGEAL REFLUX DISEASE, UNSPECIFIED WHETHER ESOPHAGITIS PRESENT: ICD-10-CM

## 2025-08-18 DIAGNOSIS — R07.2 PRECORDIAL PAIN: Primary | ICD-10-CM

## 2025-08-18 PROCEDURE — 99205 OFFICE O/P NEW HI 60 MIN: CPT | Performed by: STUDENT IN AN ORGANIZED HEALTH CARE EDUCATION/TRAINING PROGRAM

## 2025-08-18 PROCEDURE — 99212 OFFICE O/P EST SF 10 MIN: CPT

## 2025-08-18 PROCEDURE — 3008F BODY MASS INDEX DOCD: CPT | Performed by: STUDENT IN AN ORGANIZED HEALTH CARE EDUCATION/TRAINING PROGRAM

## 2025-08-18 PROCEDURE — RXMED WILLOW AMBULATORY MEDICATION CHARGE

## 2025-08-18 RX ORDER — ISOSORBIDE MONONITRATE 30 MG/1
30 TABLET, EXTENDED RELEASE ORAL DAILY
Qty: 30 TABLET | Refills: 11 | Status: SHIPPED | OUTPATIENT
Start: 2025-08-18 | End: 2026-08-18

## 2025-08-18 RX ORDER — PYRIDOXINE HCL (VITAMIN B6) 50 MG
250 TABLET ORAL DAILY
COMMUNITY

## 2025-08-18 ASSESSMENT — PATIENT HEALTH QUESTIONNAIRE - PHQ9
2. FEELING DOWN, DEPRESSED OR HOPELESS: NOT AT ALL
SUM OF ALL RESPONSES TO PHQ9 QUESTIONS 1 AND 2: 0
1. LITTLE INTEREST OR PLEASURE IN DOING THINGS: NOT AT ALL

## 2025-08-18 ASSESSMENT — ENCOUNTER SYMPTOMS
DEPRESSION: 0
LOSS OF SENSATION IN FEET: 0
OCCASIONAL FEELINGS OF UNSTEADINESS: 1

## 2025-08-18 ASSESSMENT — COLUMBIA-SUICIDE SEVERITY RATING SCALE - C-SSRS
6. HAVE YOU EVER DONE ANYTHING, STARTED TO DO ANYTHING, OR PREPARED TO DO ANYTHING TO END YOUR LIFE?: NO
1. IN THE PAST MONTH, HAVE YOU WISHED YOU WERE DEAD OR WISHED YOU COULD GO TO SLEEP AND NOT WAKE UP?: NO
2. HAVE YOU ACTUALLY HAD ANY THOUGHTS OF KILLING YOURSELF?: NO

## 2025-08-18 ASSESSMENT — PAIN SCALES - GENERAL: PAINLEVEL_OUTOF10: 0-NO PAIN

## 2025-08-19 PROBLEM — K21.9 GASTROESOPHAGEAL REFLUX DISEASE: Status: ACTIVE | Noted: 2025-08-19

## 2025-08-19 PROBLEM — R07.2 PRECORDIAL PAIN: Status: ACTIVE | Noted: 2025-08-19

## 2025-08-20 ENCOUNTER — PHARMACY VISIT (OUTPATIENT)
Dept: PHARMACY | Facility: CLINIC | Age: 45
End: 2025-08-20
Payer: COMMERCIAL

## 2025-09-03 ENCOUNTER — APPOINTMENT (OUTPATIENT)
Dept: DERMATOLOGY | Facility: CLINIC | Age: 45
End: 2025-09-03
Payer: COMMERCIAL

## 2025-09-03 PROCEDURE — RXMED WILLOW AMBULATORY MEDICATION CHARGE

## 2025-09-03 ASSESSMENT — DERMATOLOGY QUALITY OF LIFE (QOL) ASSESSMENT
RATE HOW BOTHERED YOU ARE BY SYMPTOMS OF YOUR SKIN PROBLEM (EG, ITCHING, STINGING BURNING, HURTING OR SKIN IRRITATION): 0 - NEVER BOTHERED
RATE HOW BOTHERED YOU ARE BY EFFECTS OF YOUR SKIN PROBLEMS ON YOUR ACTIVITIES (EG, GOING OUT, ACCOMPLISHING WHAT YOU WANT, WORK ACTIVITIES OR YOUR RELATIONSHIPS WITH OTHERS): 0 - NEVER BOTHERED
RATE HOW EMOTIONALLY BOTHERED YOU ARE BY YOUR SKIN PROBLEM (FOR EXAMPLE, WORRY, EMBARRASSMENT, FRUSTRATION): 0 - NEVER BOTHERED
RATE HOW BOTHERED YOU ARE BY EFFECTS OF YOUR SKIN PROBLEMS ON YOUR ACTIVITIES (EG, GOING OUT, ACCOMPLISHING WHAT YOU WANT, WORK ACTIVITIES OR YOUR RELATIONSHIPS WITH OTHERS): 0 - NEVER BOTHERED
RATE HOW BOTHERED YOU ARE BY SYMPTOMS OF YOUR SKIN PROBLEM (EG, ITCHING, STINGING BURNING, HURTING OR SKIN IRRITATION): 0 - NEVER BOTHERED
RATE HOW EMOTIONALLY BOTHERED YOU ARE BY YOUR SKIN PROBLEM (FOR EXAMPLE, WORRY, EMBARRASSMENT, FRUSTRATION): 0 - NEVER BOTHERED

## 2025-09-05 ENCOUNTER — PHARMACY VISIT (OUTPATIENT)
Dept: PHARMACY | Facility: CLINIC | Age: 45
End: 2025-09-05
Payer: COMMERCIAL

## 2025-10-29 ENCOUNTER — APPOINTMENT (OUTPATIENT)
Dept: ALLERGY | Facility: CLINIC | Age: 45
End: 2025-10-29
Payer: COMMERCIAL